# Patient Record
Sex: FEMALE | Race: WHITE | NOT HISPANIC OR LATINO | Employment: OTHER | URBAN - NONMETROPOLITAN AREA
[De-identification: names, ages, dates, MRNs, and addresses within clinical notes are randomized per-mention and may not be internally consistent; named-entity substitution may affect disease eponyms.]

---

## 2018-06-28 ENCOUNTER — HISTORICAL (OUTPATIENT)
Dept: ADMINISTRATIVE | Facility: HOSPITAL | Age: 63
End: 2018-06-28

## 2019-07-22 ENCOUNTER — HISTORICAL (OUTPATIENT)
Dept: ADMINISTRATIVE | Facility: HOSPITAL | Age: 64
End: 2019-07-22

## 2020-10-13 ENCOUNTER — HISTORICAL (OUTPATIENT)
Dept: ADMINISTRATIVE | Facility: HOSPITAL | Age: 65
End: 2020-10-13

## 2021-05-03 LAB
ALBUMIN SERPL-MCNC: 4.5 G/DL (ref 3.4–5)
ALBUMIN/GLOB SERPL: 1.6 {RATIO}
ALP SERPL-CCNC: 97 U/L (ref 50–144)
ALT SERPL-CCNC: 24 U/L (ref 1–45)
ANION GAP SERPL CALC-SCNC: 11 MMOL/L (ref 7–16)
AST SERPL-CCNC: 37 U/L (ref 14–36)
BASOPHILS # BLD AUTO: 0.02 X10(3)/MCL (ref 0.01–0.08)
BASOPHILS NFR BLD AUTO: 0.1 % (ref 0.1–1.2)
BILIRUB SERPL-MCNC: 0.53 MG/DL (ref 0.1–1)
BUN SERPL-MCNC: 23 MG/DL (ref 7–20)
CALCIUM SERPL-MCNC: 8.9 MG/DL (ref 8.4–10.2)
CHLORIDE SERPL-SCNC: 97 MMOL/L (ref 94–110)
CHOLEST SERPL-MCNC: 210 MG/DL (ref 0–200)
CO2 SERPL-SCNC: 31 MMOL/L (ref 21–32)
CREAT SERPL-MCNC: 0.8 MG/DL (ref 0.52–1.04)
CREAT/UREA NIT SERPL: 28.8 (ref 12–20)
DEPRECATED CALCIDIOL+CALCIFEROL SERPL-MC: 74.7 NG/ML (ref 30–100)
EOSINOPHIL # BLD AUTO: 0.39 X10(3)/MCL (ref 0.04–0.36)
EOSINOPHIL NFR BLD AUTO: 2.6 % (ref 0.7–7)
ERYTHROCYTE [DISTWIDTH] IN BLOOD BY AUTOMATED COUNT: 12.9 % (ref 11–14.5)
EST. AVERAGE GLUCOSE BLD GHB EST-MCNC: 114 MG/DL (ref 70–115)
GLOBULIN SER-MCNC: 2.9 G/DL (ref 2–3.9)
GLUCOSE SERPL-MCNC: 81 MGM./DL (ref 70–115)
HBA1C MFR BLD: 5.8 % (ref 4–6)
HCT VFR BLD AUTO: 43.7 % (ref 36–48)
HDLC SERPL-MCNC: 73 MG/DL (ref 40–60)
HGB BLD-MCNC: 14.4 G/DL (ref 11.8–16)
IMM GRANULOCYTES # BLD AUTO: 0.05 X10E3/UL (ref 0–0.03)
IMM GRANULOCYTES NFR BLD AUTO: 0.3 % (ref 0–0.5)
LDLC SERPL CALC-MCNC: 101.6 MG/DL (ref 30–100)
LYMPHOCYTES # BLD AUTO: 4 X10(3)/MCL (ref 1.16–3.74)
LYMPHOCYTES NFR BLD AUTO: 27.1 % (ref 20–55)
MCH RBC QN AUTO: 31 PG (ref 27–34)
MCHC RBC AUTO-ENTMCNC: 33 G/DL (ref 31–37)
MCV RBC AUTO: 94 FL (ref 79–99)
MONOCYTES # BLD AUTO: 1.17 X10(3)/MCL (ref 0.24–0.36)
MONOCYTES NFR BLD AUTO: 7.9 % (ref 4.7–12.5)
NEUTROPHILS # BLD AUTO: 9.11 X10(3)/MCL (ref 1.56–6.13)
NEUTROPHILS NFR BLD AUTO: 62 % (ref 37–73)
PLATELET # BLD AUTO: 280 X10(3)/MCL (ref 140–371)
PMV BLD AUTO: 11.6 FL (ref 9.4–12.4)
POTASSIUM SERPL-SCNC: 3.6 MMOL/L (ref 3.5–5.1)
PROT SERPL-MCNC: 7.4 G/DL (ref 6.3–8.2)
RBC # BLD AUTO: 4.65 X10(6)/MCL (ref 4–5.1)
SODIUM SERPL-SCNC: 139 MMOL/L (ref 135–145)
TRIGL SERPL-MCNC: 176 MG/DL (ref 30–200)
TSH SERPL-ACNC: 0.48 UIU/ML (ref 0.36–3.74)
WBC # SPEC AUTO: 14.7 X10(3)/MCL (ref 4–11.5)

## 2021-11-03 LAB
ALBUMIN SERPL-MCNC: 4.3 G/DL (ref 3.4–5)
ALBUMIN/GLOB SERPL: 1.6 {RATIO}
ALP SERPL-CCNC: 102 U/L (ref 50–144)
ALT SERPL-CCNC: 20 U/L (ref 1–45)
ANION GAP SERPL CALC-SCNC: 11 MMOL/L (ref 7–16)
AST SERPL-CCNC: 31 U/L (ref 14–36)
BASOPHILS # BLD AUTO: 0.05 10*3/UL (ref 0.01–0.08)
BASOPHILS NFR BLD AUTO: 0.4 % (ref 0.1–1.2)
BCS RECOMMENDATION EXT: NORMAL
BILIRUB SERPL-MCNC: 0.58 MG/DL (ref 0–1)
BUN SERPL-MCNC: 20 MG/DL (ref 7–20)
CALCIUM SERPL-MCNC: 9.1 MG/DL (ref 8.4–10.2)
CHLORIDE SERPL-SCNC: 100 MMOL/L (ref 94–110)
CHOLEST SERPL-MCNC: 177 MG/DL (ref 0–200)
CO2 SERPL-SCNC: 30 MMOL/L (ref 21–32)
CREAT SERPL-MCNC: 1.14 MG/DL (ref 0.52–1.04)
CREAT/UREA NIT SERPL: 17.5 (ref 12–20)
EOSINOPHIL # BLD AUTO: 0.44 10*3/UL (ref 0.04–0.36)
EOSINOPHIL NFR BLD AUTO: 3.8 % (ref 0.7–7)
ERYTHROCYTE [DISTWIDTH] IN BLOOD BY AUTOMATED COUNT: 12.1 % (ref 11–14.5)
EST. AVERAGE GLUCOSE BLD GHB EST-MCNC: 114 MG/DL (ref 70–115)
GLOBULIN SER-MCNC: 2.7 G/DL (ref 2–3.9)
GLUCOSE SERPL-MCNC: 112 MGM./DL (ref 70–115)
HBA1C MFR BLD: 5.8 % (ref 4–6)
HCT VFR BLD AUTO: 42.2 % (ref 36–48)
HDLC SERPL-MCNC: 66 MG/DL (ref 40–60)
HGB BLD-MCNC: 14.2 G/DL (ref 11.8–16)
IMM GRANULOCYTES # BLD AUTO: 0.05 10*3/UL (ref 0–0.03)
IMM GRANULOCYTES NFR BLD AUTO: 0.4 % (ref 0–0.5)
LDLC SERPL CALC-MCNC: 70.1 MG/DL (ref 30–100)
LYMPHOCYTES # BLD AUTO: 3.34 10*3/UL (ref 1.16–3.74)
LYMPHOCYTES NFR BLD AUTO: 28.8 % (ref 20–55)
MCH RBC QN AUTO: 30.8 PG (ref 27–34)
MCHC RBC AUTO-ENTMCNC: 33.6 G/DL (ref 31–37)
MCV RBC AUTO: 91.5 FL (ref 79–99)
MONOCYTES # BLD AUTO: 0.91 10*3/UL (ref 0.24–0.36)
MONOCYTES NFR BLD AUTO: 7.8 % (ref 4.7–12.5)
NEUTROPHILS # BLD AUTO: 6.81 10*3/UL (ref 1.56–6.13)
NEUTROPHILS NFR BLD AUTO: 58.8 % (ref 37–73)
PLATELET # BLD AUTO: 269 10*3/UL (ref 140–371)
PMV BLD AUTO: 11.5 FL (ref 9.4–12.4)
POTASSIUM SERPL-SCNC: 4.1 MMOL/L (ref 3.5–5.1)
PROT SERPL-MCNC: 7 G/DL (ref 6.3–8.2)
RBC # BLD AUTO: 4.61 10*6/UL (ref 4–5.1)
SODIUM SERPL-SCNC: 141 MMOL/L (ref 135–145)
TRIGL SERPL-MCNC: 286 MG/DL (ref 30–200)
WBC # SPEC AUTO: 11.6 10*3/UL (ref 4–11.5)

## 2021-12-22 LAB — NONINV COLON CA DNA+OCC BLD SCRN STL QL: NEGATIVE

## 2022-04-10 ENCOUNTER — HISTORICAL (OUTPATIENT)
Dept: ADMINISTRATIVE | Facility: HOSPITAL | Age: 67
End: 2022-04-10

## 2022-04-25 VITALS
SYSTOLIC BLOOD PRESSURE: 110 MMHG | HEIGHT: 64 IN | OXYGEN SATURATION: 97 % | WEIGHT: 225.31 LBS | BODY MASS INDEX: 38.47 KG/M2 | DIASTOLIC BLOOD PRESSURE: 64 MMHG

## 2022-05-03 ENCOUNTER — HISTORICAL (OUTPATIENT)
Dept: ADMINISTRATIVE | Facility: HOSPITAL | Age: 67
End: 2022-05-03

## 2022-05-03 LAB
AGE: 67
ALBUMIN SERPL-MCNC: 4.9 G/DL (ref 3.4–5)
ALBUMIN/GLOB SERPL: 1.8 {RATIO}
ALP SERPL-CCNC: 123 U/L (ref 50–144)
ALT SERPL-CCNC: 23 U/L (ref 1–45)
ANION GAP SERPL CALC-SCNC: 11 MMOL/L (ref 2–13)
AST SERPL-CCNC: 40 U/L (ref 14–36)
BASOPHILS # BLD AUTO: 0.05 10*3/UL (ref 0.01–0.08)
BASOPHILS NFR BLD AUTO: 0.5 % (ref 0.1–1.2)
BILIRUB SERPL-MCNC: 1 MG/DL (ref 0–1)
BUN SERPL-MCNC: 19 MG/DL (ref 7–20)
CALCIUM SERPL-MCNC: 9.4 MG/DL (ref 8.4–10.2)
CHLORIDE SERPL-SCNC: 100 MMOL/L (ref 98–110)
CHOLEST SERPL-MCNC: 196 MG/DL (ref 0–200)
CO2 SERPL-SCNC: 28 MMOL/L (ref 21–32)
CREAT SERPL-MCNC: 1.15 MG/DL (ref 0.52–1.04)
CREAT/UREA NIT SERPL: 16.5 (ref 12–20)
EOSINOPHIL # BLD AUTO: 0.25 10*3/UL (ref 0.04–0.36)
EOSINOPHIL NFR BLD AUTO: 2.3 % (ref 0.7–7)
ERYTHROCYTE [DISTWIDTH] IN BLOOD BY AUTOMATED COUNT: 12.1 % (ref 11–14.5)
EST. AVERAGE GLUCOSE BLD GHB EST-MCNC: 105 MG/DL (ref 70–115)
GLOBULIN SER-MCNC: 2.8 G/DL (ref 2–3.9)
GLUCOSE SERPL-MCNC: 109 MG/DL (ref 70–115)
HBA1C MFR BLD: 5.5 % (ref 4–6)
HCT VFR BLD AUTO: 42.6 % (ref 36–48)
HDLC SERPL-MCNC: 74 MG/DL (ref 40–60)
HGB BLD-MCNC: 15 G/DL (ref 11.8–16)
IMM GRANULOCYTES # BLD AUTO: 0.02 10*3/UL (ref 0–0.03)
IMM GRANULOCYTES NFR BLD AUTO: 0.2 % (ref 0–0.5)
LDLC SERPL CALC-MCNC: 79.7 MG/DL (ref 30–100)
LYMPHOCYTES # BLD AUTO: 2.86 10*3/UL (ref 1.16–3.74)
LYMPHOCYTES NFR BLD AUTO: 26.1 % (ref 20–55)
MANUAL DIFF? (OHS): NORMAL
MCH RBC QN AUTO: 31.4 PG (ref 27–34)
MCHC RBC AUTO-ENTMCNC: 35.2 G/DL (ref 31–37)
MCV RBC AUTO: 89.3 FL (ref 79–99)
MONOCYTES # BLD AUTO: 0.78 10*3/UL (ref 0.24–0.36)
MONOCYTES NFR BLD AUTO: 7.1 % (ref 4.7–12.5)
NEUTROPHILS # BLD AUTO: 7.01 10*3/UL (ref 1.56–6.13)
NEUTROPHILS NFR BLD AUTO: 63.8 % (ref 37–73)
PLATELET # BLD AUTO: 290 10*3/UL (ref 140–371)
PMV BLD AUTO: 11.4 FL (ref 9.4–12.4)
POTASSIUM SERPL-SCNC: 3.9 MMOL/L (ref 3.5–5.1)
PROT SERPL-MCNC: 7.7 G/DL (ref 6.3–8.2)
RBC # BLD AUTO: 4.77 10*6/UL (ref 4–5.1)
SODIUM SERPL-SCNC: 139 MMOL/L (ref 135–145)
TRIGL SERPL-MCNC: 112 MG/DL (ref 30–200)
TSH SERPL-ACNC: 0.74 M[IU]/L (ref 0.36–3.74)
WBC # SPEC AUTO: 11 10*3/UL (ref 4–11.5)

## 2022-05-03 NOTE — HISTORICAL [facility]ER
This is a historical note converted from Luc. Formatting and pictures may have been removed.  Please reference Luc for original formatting and attached multimedia. Chief Complaint  check up  History of Present Illness  65-year-old female?here for follow-up on chronic medical conditions. ?See assessment plan for each individual listed problems  Review of Systems  Feels like she had a little flareup of her lupus after her first and second Covid vaccines otherwise doing well  Physical Exam  Vitals & Measurements  T:?36.9? ?C (Oral)? HR:?68(Peripheral)? BP:?110/70? SpO2:?97%?  HT:?162.50?cm? WT:?103.200?kg? BMI:?39.08?  Heart with regular rate and rhythm  Lungs clear to auscultation bilateral  Medicare Visit  Health Risk Assessment  Feeling down, depressed, anx or nerv: Not at all  Pain present in last 4 wks: Moderate pain  Has it been hard for you to get help: Yes, as much as I wanted  Hardest physical activity for 2 min.: Moderate  Have trouble getting around outside: Yes  Do you need assistance while shopping: Yes  Can you prepare your own meals: Yes  Can you do your housework without help: Yes  Do you need help at home with needs: No  Can you handle your own money: Yes  How have things been going for you: Pretty well  Are you having difficulty driving: No  Seatbelt always on when driving: Yes, sometimes  Falling or dizzy when standing up: Never  Sexual problems: Never  Trouble eating well: Never  Teeth or denture problems: Never  Problems using the telephone: Never  Tired or fatigue: Often  Have you fallen two or more times: No  Are you afraid of falling: No  Are you physically active 3 ?days a week: Yes, some of the time  Hazards in your house that mt hurt you: Yes  Keeping track of your medications: Yes  Do you have trouble taking med as rx: I always take them as prescribed  Can you manage your health problems: Very Confident  Visual Acuity  Corrective Lenses: Glasses  Eye, Right Visual Acuity: 20/30  Eye,  Right w/Correction Visual Acuity: 20/30  Eye, Left Visual Acuity: 20/30  Eye, Left w/Correction Visual Acuity: 20/25  Eye, Bilateral Visual Acuity: 20/30  Eye, Bilat w/Correction Visual Acuity: 20/20  Cognitive Assessment  Response to Current Year: Correct  Response to Current Month: Correct  Response to Current Time: Correct  Count Backward 20 to 1: Correct  State Months in Reverse Order: Correct  Repeat Memory Phrase: Correct  [facility] Test Score Indication: None or no significant cognitive impairment  Orientation Memory Concentration Score: 0  Functional Assessment  Living Situation: Home independently  Home Equipment: Blood pressure monitor, Nebulizer  Fall Risk  History of Fall in Last 3 Months Feliciano: No  Presence of Secondary Diagnosis Feliciano: Yes  Use of Ambulatory Aid Feliciano: None, bedrest, wheelchair, nurse  IV/Heparin Lock Fall Risk Feliciano: No  Gait Weak or Impaired Fall Risk Feliciano: Normal, bedrest, immobile  Mental Status Fall Risk Feliciano: Oriented to own ability  Feliciano Fall Risk Score: 15  Home Safety  Emergency Numbers Kept/Updated: Yes  Aware of Smoking Dangers: Yes  Smoke Alarms/Fire Extinguisher Available: Yes  Household Members Fire Safety Knowledge: Yes  Firearms Unloaded and Secure: Yes  Floor Rugs Removed or Fastened: Yes  Mats in Bathtub/Shower: Yes  Stairway Rails or Banisters: Yes  Outdoor Clutter Safety: Yes  Indoor Clutter Safety: Yes  Electrical Cord Safety: Yes  Depression Screening  Initial Depression Screen Score: 0  TUG Test  Time in Seconds: 5  Observations: Slow tentative pace  Advance Directive  Advanced Directives: No  Advance Directive Additional Information: Yes  Assessment/Plan  1.?Benign essential hypertension ? I10  ?Blood pressure currently well controlled?on enalapril  2.?Diabetes mellitus type 2 ? E11.9  ?Has been doing well on Metformin.? We will be checking an A1c today  3.?HLD - Hyperlipidemia ? E78.5  ?On atorvastatin  4.?Hypothyroidism ? E03.9  ?Continue levothyroxine?125 mcg  daily. ?We will be checking a TSH today  5.?Lyme disease, unspecified ? A69.20  ?Continue daily azithromycin  6.?Peripheral neuropathy ? G64  7.?SLE - Systemic lupus erythematosus ? M32.9  ?On Celebrex?and occasional Medrol packs for flareups  8.?Medicare annual wellness visit, subsequent ? Z00.00  ?Up-to-date on mammography and colonoscopy.? She needs to do her diabetic eye exam  Orders:  C-Reactive Protein Quant - [facility] [id number], Routine collect, 05/03/21 13:59:00 CDT, Blood, Stop date 05/03/21 13:59:00 CDT, Lab Collect, [id number], Benign essential hypertension  Diabetes mellitus type 2  HLD - Hyperlipidemia  Hypothyroidism  Lyme disease, unspecified  Peripheral neuropathy...  Clinic Follow up, *Est. 11/03/21 13:30:00 CDT, Order for future visit, Benign essential hypertension, [facility]  Medicare Annual Wellness- Subsequent  [facility], Benign essential hypertension  Diabetes mellitus type 2  HLD - Hyperlipidemia  Hypothyroidism  Lyme disease, unspecified  Peripheral neuropathy  SLE - Systemic lupus erythematosus  Medicare annual wellness visit, subsequent, [facility]  Office/Outpatient Visit Level 4 Established [id number] PC, Benign essential hypertension  Diabetes mellitus type 2  HLD - Hyperlipidemia  Hypothyroidism  Lyme disease, unspecified  Peripheral neuropathy  SLE - Systemic lupus erythematosus  Medicare annual wellness visit, subsequent, [facility]  Request Eye Exam Results, 05/03/21 14:03:00 CDT, Benign essential hypertension  Diabetes mellitus type 2  HLD - Hyperlipidemia  Hypothyroidism  Lyme disease, unspecified  Peripheral neuropathy  SLE - Systemic lupus erythematosus  Medicare annual wellness visit, subsequent  Follow-up in 6 months  Referrals  Clinic Follow up, *Est. 11/03/21 13:30:00 CDT, Order for future visit, Benign essential hypertension, [facility]   Problem List/Past Medical  History  Ongoing  Benign essential hypertension  Diabetes mellitus type 2  HLD - Hyperlipidemia  Hypothyroidism  Lyme disease, unspecified  Medicare annual wellness visit, subsequent  Obesity  Peripheral neuropathy  Seborrheic keratosis  SLE - Systemic lupus erythematosus  Historical  No qualifying data  Procedure/Surgical History  Appendectomy ()  Hysterectomy ()  Bilateral tubal ligation ()   section ()  T and A (tonsillectomy and adenoidectomy) postoperative education ()   Medications  acyclovir 800 mg oral tablet, 800 mg= 1 tab(s), Oral, BID  Advair Diskus 250 mcg-50 mcg inhalation powder, 1 puff(s), INH, BID, 5 refills  atenolol 100 mg oral tablet, 100 mg= 1 tab(s), Oral, Daily, 3 refills  atorvastatin 20 mg oral tablet, 20 mg= 1 tab(s), Oral, Daily, 1 refills  azithromycin 250 mg oral tablet, 250 mg= 1 tab(s), Oral, Daily, 1 refills  bumetanide 1 mg oral tablet, 1 mg= 1 tab(s), Oral, Daily, 5 refills  celecoxib 200 mg oral capsule, See Instructions, 1 refills  dicyclomine 10 mg oral capsule, 20 mg= 2 cap(s), Oral, QID, 1 refills  levothyroxine 125 mcg (0.125 mg) oral tablet, 125 mcg= 1 tab(s), Oral, Daily, 1 refills  metformin 500 mg oral tablet, 500 mg= 1 tab(s), Oral, Daily, 1 refills  Prilosec 20 mg oral DR capsule, 20 mg= 1 cap(s), Oral, Daily  traMADol 50 mg oral tablet, 100 mg= 2 tab(s), Oral, q6hr, PRN, 3 refills  Vasotec 5 mg oral tablet, 5 mg= 1 tab(s), Oral, Daily, 1 refills  Ventolin HFA 90 mcg/inh inhalation aerosol, 2 puff(s), INH, q6hr, PRN, 5 refills  Vitamin D3 5000 intl units (125 mcg) oral capsule, 5000 IntUnit= 1 cap(s), Oral, Daily  Xanax 1 mg oral tablet, 1 mg= 1 tab(s), Oral, Daily, 3 refills  Allergies  Morphine Sulfate?(Unspecified)  Zofran?(Unspecified)  acetaminophen-hydrocodone?(Unspecified)  penicillins?(Unspecified)  Social History  Abuse/Neglect  No, 2021  Tobacco  Former smoker, quit more than 30 days ago, No, 2021  Family History  Cardiac  arrhythmia.: Mother.  Dementia: Father.  Hypertension.: Mother and Father.  Primary malignant neoplasm of prostate: Father.  Immunizations  Vaccine Date Status   COVID-19 MRNA, LNP-S, PF- Pfizer 03/04/2021 Recorded   COVID-19 MRNA, LNP-S, PF- Pfizer 02/11/2021 Recorded   influenza virus vaccine, inactivated 09/11/2020 Recorded   influenza virus vaccine, inactivated 09/21/2019 Recorded   influenza virus vaccine, inactivated 09/07/2018 Recorded   influenza virus vaccine, inactivated 09/09/2017 Recorded   influenza virus vaccine, inactivated 09/26/2014 Recorded   zoster vaccine live 07/25/2014 Recorded   Health Maintenance  Health Maintenance  ???Pending?(in the next year)  ??? ??OverDue  ??? ? ? ?Influenza Vaccine due??10/01/20??and every 1??day(s)  ??? ? ? ?Alcohol Misuse Screening due??01/02/21??and every 1??year(s)  ??? ??Due?  ??? ? ? ?ADL Screening due??05/03/21??and every 1??year(s)  ??? ? ? ?Aspirin Therapy for CVD Prevention due??05/03/21??and every 1??year(s)  ??? ? ? ?Bone Density Screening due??05/03/21??Variable frequency  ??? ? ? ?Colorectal Screening due??05/03/21??Unknown Frequency  ??? ? ? ?Diabetes Maintenance-Eye Exam due??05/03/21??Unknown Frequency  ??? ? ? ?Diabetes Maintenance-Fasting Lipid Profile due??05/03/21??Variable frequency  ??? ? ? ?Diabetes Maintenance-Foot Exam due??05/03/21??Unknown Frequency  ??? ? ? ?Diabetes Maintenance-Serum Creatinine due??05/03/21??Variable frequency  ??? ? ? ?Hypertension Management-Education due??05/03/21??and every 1??year(s)  ??? ? ? ?Pneumococcal Vaccine due??05/03/21??Unknown Frequency  ??? ? ? ?Tetanus Vaccine due??05/03/21??and every 10??year(s)  ??? ? ? ?Zoster Vaccine due??05/03/21??Unknown Frequency  ??? ??Due In Future?  ??? ? ? ?Diabetes Maintenance-HgbA1c not due until??08/24/21??and every 1??year(s)  ??? ? ? ?Hypertension Management-BMP not due until??08/24/21??and every 1??year(s)  ??? ? ? ?Obesity Screening not due until??01/01/22??and every  1??year(s)  ??? ? ? ?Advance Directive not due until??01/02/22??and every 1??year(s)  ??? ? ? ?Cognitive Screening not due until??01/02/22??and every 1??year(s)  ??? ? ? ?Fall Risk Assessment not due until??01/02/22??and every 1??year(s)  ??? ? ? ?Functional Assessment not due until??01/02/22??and every 1??year(s)  ???Satisfied?(in the past 1 year)  ??? ??Satisfied?  ??? ? ? ?Advance Directive on??05/03/21.??Satisfied by Song SANCHEZ, Vikki L.  ??? ? ? ?Blood Pressure Screening on??05/03/21.??Satisfied by Song SANCHEZ, Vikki L.  ??? ? ? ?Body Mass Index Check on??05/03/21.??Satisfied by Song SANCHEZ, Vikki L.  ??? ? ? ?Cognitive Screening on??05/03/21.??Satisfied by Song SANCHEZ, Vikki L.  ??? ? ? ?Depression Screening on??05/03/21.??Satisfied by Song SANCHEZ, Vikki L.  ??? ? ? ?Diabetes Maintenance-Eye Exam on??05/03/21.??Satisfied by Cuong White MD  ??? ? ? ?Fall Risk Assessment on??05/03/21.??Satisfied by Song SANCHEZ, Vikki L.  ??? ? ? ?Functional Assessment on??05/03/21.??Satisfied by Song SANCHEZ, Vikki L.  ??? ? ? ?Hypertension Management-Blood Pressure on??05/03/21.??Satisfied by Song SANCHEZ, Vikki L.  ??? ? ? ?Influenza Vaccine on??03/03/21.??Satisfied by Song SANCHEZ, Vikki L.  ??? ? ? ?Medicare Annual Wellness Exam on??05/03/21.??Satisfied by Cuong White MD  ??? ? ? ?Obesity Screening on??05/03/21.??Satisfied by Song SANCHEZ, Vikki L.  ?      ambulatory

## 2022-08-29 ENCOUNTER — DOCUMENTATION ONLY (OUTPATIENT)
Dept: ADMINISTRATIVE | Facility: HOSPITAL | Age: 67
End: 2022-08-29

## 2022-11-03 ENCOUNTER — HISTORICAL (OUTPATIENT)
Dept: ADMINISTRATIVE | Facility: HOSPITAL | Age: 67
End: 2022-11-03

## 2023-01-25 DIAGNOSIS — M32.9 SYSTEMIC LUPUS ERYTHEMATOSUS, UNSPECIFIED SLE TYPE, UNSPECIFIED ORGAN INVOLVEMENT STATUS: ICD-10-CM

## 2023-01-25 DIAGNOSIS — B34.9 RECURRENT VIRAL INFECTION: ICD-10-CM

## 2023-01-25 DIAGNOSIS — F41.9 ANXIETY: Primary | ICD-10-CM

## 2023-01-25 RX ORDER — TRAMADOL HYDROCHLORIDE 50 MG/1
100 TABLET ORAL EVERY 6 HOURS PRN
Qty: 60 TABLET | Refills: 2 | Status: SHIPPED | OUTPATIENT
Start: 2023-01-25 | End: 2023-06-06 | Stop reason: SDUPTHER

## 2023-01-25 RX ORDER — ALPRAZOLAM 1 MG/1
1 TABLET ORAL EVERY MORNING
COMMUNITY
Start: 2022-11-30 | End: 2023-01-25 | Stop reason: SDUPTHER

## 2023-01-25 RX ORDER — TRAMADOL HYDROCHLORIDE 50 MG/1
100 TABLET ORAL EVERY 6 HOURS PRN
COMMUNITY
Start: 2022-10-24 | End: 2023-01-25 | Stop reason: SDUPTHER

## 2023-01-25 RX ORDER — ACYCLOVIR 800 MG/1
800 TABLET ORAL 2 TIMES DAILY
Qty: 10 TABLET | Refills: 0 | Status: SHIPPED | OUTPATIENT
Start: 2023-01-25 | End: 2023-06-06 | Stop reason: SDUPTHER

## 2023-01-25 RX ORDER — ACYCLOVIR 800 MG/1
1 TABLET ORAL 2 TIMES DAILY
COMMUNITY
Start: 2021-12-22 | End: 2023-01-25 | Stop reason: SDUPTHER

## 2023-01-25 RX ORDER — ALPRAZOLAM 1 MG/1
1 TABLET ORAL EVERY MORNING
Qty: 30 TABLET | Refills: 2 | Status: SHIPPED | OUTPATIENT
Start: 2023-01-25 | End: 2023-06-06 | Stop reason: SDUPTHER

## 2023-01-25 NOTE — TELEPHONE ENCOUNTER
----- Message from Bianca Campbell MA sent at 1/25/2023  9:39 AM CST -----  Regarding: Refill  Contact: Keisha Pelayo  .Type:  RX Refill Request    Who Called: Keisha Pelayo    RX Name and Strength:    acyclovir 800mg, BID x 5days,   tramadol 50mg, q6h,   alprazolam 1mg, QD    Preferred Pharmacy: [facility]      Best Call Back Number:[phone]

## 2023-01-30 ENCOUNTER — DOCUMENTATION ONLY (OUTPATIENT)
Dept: ADMINISTRATIVE | Facility: HOSPITAL | Age: 68
End: 2023-01-30
Payer: MEDICARE

## 2023-04-12 ENCOUNTER — TELEPHONE (OUTPATIENT)
Dept: FAMILY MEDICINE | Facility: CLINIC | Age: 68
End: 2023-04-12
Payer: MEDICARE

## 2023-04-12 DIAGNOSIS — I10 HYPERTENSION, UNSPECIFIED TYPE: Primary | ICD-10-CM

## 2023-04-12 RX ORDER — ENALAPRIL MALEATE 5 MG/1
5 TABLET ORAL DAILY
Qty: 90 TABLET | Refills: 1 | Status: SHIPPED | OUTPATIENT
Start: 2023-04-12 | End: 2023-10-10 | Stop reason: SDUPTHER

## 2023-04-12 RX ORDER — METFORMIN HYDROCHLORIDE 500 MG/1
500 TABLET ORAL
COMMUNITY
Start: 2023-03-06 | End: 2023-06-06 | Stop reason: SDUPTHER

## 2023-04-12 RX ORDER — ATORVASTATIN CALCIUM 20 MG/1
20 TABLET, FILM COATED ORAL
COMMUNITY
Start: 2023-04-12 | End: 2023-06-06 | Stop reason: SDUPTHER

## 2023-05-03 ENCOUNTER — OFFICE VISIT (OUTPATIENT)
Dept: FAMILY MEDICINE | Facility: CLINIC | Age: 68
End: 2023-05-03
Payer: MEDICARE

## 2023-05-03 VITALS
OXYGEN SATURATION: 96 % | DIASTOLIC BLOOD PRESSURE: 58 MMHG | HEIGHT: 64 IN | WEIGHT: 213.38 LBS | SYSTOLIC BLOOD PRESSURE: 98 MMHG | TEMPERATURE: 97 F | BODY MASS INDEX: 36.43 KG/M2 | HEART RATE: 67 BPM

## 2023-05-03 DIAGNOSIS — E11.9 TYPE 2 DIABETES MELLITUS WITHOUT COMPLICATION, WITHOUT LONG-TERM CURRENT USE OF INSULIN: ICD-10-CM

## 2023-05-03 DIAGNOSIS — M32.9 SYSTEMIC LUPUS ERYTHEMATOSUS, UNSPECIFIED SLE TYPE, UNSPECIFIED ORGAN INVOLVEMENT STATUS: ICD-10-CM

## 2023-05-03 DIAGNOSIS — I10 PRIMARY HYPERTENSION: ICD-10-CM

## 2023-05-03 DIAGNOSIS — N18.31 CHRONIC KIDNEY DISEASE, STAGE 3A: Primary | ICD-10-CM

## 2023-05-03 DIAGNOSIS — E78.2 MIXED HYPERLIPIDEMIA: ICD-10-CM

## 2023-05-03 DIAGNOSIS — E03.9 ACQUIRED HYPOTHYROIDISM: ICD-10-CM

## 2023-05-03 PROCEDURE — 99214 OFFICE O/P EST MOD 30 MIN: CPT | Mod: ,,, | Performed by: FAMILY MEDICINE

## 2023-05-03 PROCEDURE — 99214 PR OFFICE/OUTPT VISIT, EST, LEVL IV, 30-39 MIN: ICD-10-PCS | Mod: ,,, | Performed by: FAMILY MEDICINE

## 2023-05-03 RX ORDER — ATENOLOL 100 MG/1
25 TABLET ORAL DAILY
COMMUNITY
Start: 2022-12-22

## 2023-05-03 RX ORDER — CELECOXIB 200 MG/1
200 CAPSULE ORAL 2 TIMES DAILY
COMMUNITY
Start: 2022-12-23

## 2023-05-03 RX ORDER — DICYCLOMINE HYDROCHLORIDE 10 MG/1
20 CAPSULE ORAL
COMMUNITY
Start: 2022-12-29

## 2023-05-03 RX ORDER — OMEPRAZOLE 20 MG/1
20 CAPSULE, DELAYED RELEASE ORAL DAILY
COMMUNITY

## 2023-05-03 RX ORDER — LEVOTHYROXINE SODIUM 125 UG/1
125 TABLET ORAL DAILY
COMMUNITY
Start: 2023-04-12 | End: 2023-07-21

## 2023-05-03 RX ORDER — BUMETANIDE 1 MG/1
1 TABLET ORAL DAILY
COMMUNITY
Start: 2023-04-12 | End: 2023-10-10 | Stop reason: SDUPTHER

## 2023-05-03 NOTE — PROGRESS NOTES
"SUBJECTIVE:  Keisha Pelayo is a 67 y.o. female here for Follow-up      HPI  Patient is here for follow-up.  See assessment plan for individual list of issues.  Keisha's allergies, medications, history, and problem list were updated as appropriate.    Review of Systems   She is doing pretty well at this time.  She is not been having recent flares of her lupus    No results found for this or any previous visit (from the past 504 hour(s)).    OBJECTIVE:  Vital signs  Vitals:    05/03/23 1315   BP: (!) 98/58   BP Location: Right arm   Pulse: 67   Temp: 96.8 °F (36 °C)   TempSrc: Temporal   SpO2: 96%   Weight: 96.8 kg (213 lb 6.4 oz)   Height: 5' 3.98" (1.625 m)        Physical Exam heart with a regular rate and rhythm, lungs are clear    ASSESSMENT/PLAN:  1. Chronic kidney disease, stage 3a      2. Mixed hyperlipidemia  Continue atorvastatin    3. Systemic lupus erythematosus, unspecified SLE type, unspecified organ involvement status  Doing well at this time with just Celebrex.    4. Type 2 diabetes mellitus without complication, without long-term current use of insulin  On metformin    5. Acquired hypothyroidism  On levothyroxine 125 micrograms daily    6. Primary hypertension  Still on low-dose atenolol and enalapril which she will continue because of kidney disease though her blood pressure is very well controlled at this time         Follow Up:  Follow up in about 6 months (around 11/3/2023).  And Medicare wellness.  We will do labs at that time          "

## 2023-06-06 DIAGNOSIS — B34.9 RECURRENT VIRAL INFECTION: ICD-10-CM

## 2023-06-06 DIAGNOSIS — F41.9 ANXIETY: ICD-10-CM

## 2023-06-06 DIAGNOSIS — E11.9 TYPE 2 DIABETES MELLITUS WITHOUT COMPLICATION, WITHOUT LONG-TERM CURRENT USE OF INSULIN: ICD-10-CM

## 2023-06-06 DIAGNOSIS — E78.2 MIXED HYPERLIPIDEMIA: Primary | ICD-10-CM

## 2023-06-06 DIAGNOSIS — M32.9 SYSTEMIC LUPUS ERYTHEMATOSUS, UNSPECIFIED SLE TYPE, UNSPECIFIED ORGAN INVOLVEMENT STATUS: ICD-10-CM

## 2023-06-06 RX ORDER — ATORVASTATIN CALCIUM 20 MG/1
20 TABLET, FILM COATED ORAL NIGHTLY
Qty: 90 TABLET | Refills: 1 | Status: SHIPPED | OUTPATIENT
Start: 2023-06-06 | End: 2023-12-20 | Stop reason: SDUPTHER

## 2023-06-06 RX ORDER — ALPRAZOLAM 1 MG/1
1 TABLET ORAL EVERY MORNING
Qty: 30 TABLET | Refills: 2 | Status: SHIPPED | OUTPATIENT
Start: 2023-06-06 | End: 2023-10-10 | Stop reason: SDUPTHER

## 2023-06-06 RX ORDER — METFORMIN HYDROCHLORIDE 500 MG/1
500 TABLET ORAL DAILY
Qty: 90 TABLET | Refills: 1 | Status: SHIPPED | OUTPATIENT
Start: 2023-06-06 | End: 2023-12-01

## 2023-06-06 RX ORDER — TRAMADOL HYDROCHLORIDE 50 MG/1
100 TABLET ORAL EVERY 6 HOURS PRN
Qty: 60 TABLET | Refills: 2 | Status: SHIPPED | OUTPATIENT
Start: 2023-06-06 | End: 2023-10-10 | Stop reason: SDUPTHER

## 2023-06-06 RX ORDER — ACYCLOVIR 800 MG/1
800 TABLET ORAL 2 TIMES DAILY
Qty: 10 TABLET | Refills: 0 | Status: SHIPPED | OUTPATIENT
Start: 2023-06-06 | End: 2023-07-21

## 2023-06-07 ENCOUNTER — TELEPHONE (OUTPATIENT)
Dept: FAMILY MEDICINE | Facility: CLINIC | Age: 68
End: 2023-06-07
Payer: MEDICARE

## 2023-06-07 DIAGNOSIS — M32.9 SYSTEMIC LUPUS ERYTHEMATOSUS, UNSPECIFIED SLE TYPE, UNSPECIFIED ORGAN INVOLVEMENT STATUS: Primary | ICD-10-CM

## 2023-06-07 RX ORDER — METHYLPREDNISOLONE 4 MG/1
TABLET ORAL
Qty: 21 EACH | Refills: 0 | Status: SHIPPED | OUTPATIENT
Start: 2023-06-07 | End: 2023-12-27 | Stop reason: SDUPTHER

## 2023-07-21 ENCOUNTER — TELEPHONE (OUTPATIENT)
Dept: FAMILY MEDICINE | Facility: CLINIC | Age: 68
End: 2023-07-21
Payer: MEDICARE

## 2023-07-21 DIAGNOSIS — B34.9 RECURRENT VIRAL INFECTION: ICD-10-CM

## 2023-07-21 DIAGNOSIS — E03.9 ACQUIRED HYPOTHYROIDISM: Primary | ICD-10-CM

## 2023-07-21 RX ORDER — LEVOTHYROXINE SODIUM 125 UG/1
TABLET ORAL
Qty: 90 TABLET | Refills: 0 | Status: SHIPPED | OUTPATIENT
Start: 2023-07-21 | End: 2023-10-10 | Stop reason: SDUPTHER

## 2023-07-21 RX ORDER — ACYCLOVIR 800 MG/1
TABLET ORAL
Qty: 10 TABLET | Refills: 0 | Status: SHIPPED | OUTPATIENT
Start: 2023-07-21 | End: 2023-10-11 | Stop reason: SDUPTHER

## 2023-10-10 DIAGNOSIS — M32.9 SYSTEMIC LUPUS ERYTHEMATOSUS, UNSPECIFIED SLE TYPE, UNSPECIFIED ORGAN INVOLVEMENT STATUS: ICD-10-CM

## 2023-10-10 DIAGNOSIS — I10 HYPERTENSION, UNSPECIFIED TYPE: ICD-10-CM

## 2023-10-10 DIAGNOSIS — F41.9 ANXIETY: ICD-10-CM

## 2023-10-10 DIAGNOSIS — E03.9 ACQUIRED HYPOTHYROIDISM: ICD-10-CM

## 2023-10-10 RX ORDER — TRAMADOL HYDROCHLORIDE 50 MG/1
100 TABLET ORAL EVERY 6 HOURS PRN
Qty: 60 TABLET | Refills: 2 | Status: SHIPPED | OUTPATIENT
Start: 2023-10-10 | End: 2024-03-13 | Stop reason: SDUPTHER

## 2023-10-10 RX ORDER — BUMETANIDE 1 MG/1
1 TABLET ORAL DAILY
Qty: 90 TABLET | Refills: 1 | Status: SHIPPED | OUTPATIENT
Start: 2023-10-10 | End: 2024-03-13 | Stop reason: SDUPTHER

## 2023-10-10 RX ORDER — ENALAPRIL MALEATE 5 MG/1
5 TABLET ORAL DAILY
Qty: 90 TABLET | Refills: 1 | Status: SHIPPED | OUTPATIENT
Start: 2023-10-10

## 2023-10-10 RX ORDER — ALPRAZOLAM 1 MG/1
1 TABLET ORAL EVERY MORNING
Qty: 30 TABLET | Refills: 2 | Status: SHIPPED | OUTPATIENT
Start: 2023-10-10 | End: 2024-03-13 | Stop reason: SDUPTHER

## 2023-10-10 RX ORDER — LEVOTHYROXINE SODIUM 125 UG/1
125 TABLET ORAL
Qty: 90 TABLET | Refills: 1 | Status: SHIPPED | OUTPATIENT
Start: 2023-10-10

## 2023-10-11 ENCOUNTER — TELEPHONE (OUTPATIENT)
Dept: FAMILY MEDICINE | Facility: CLINIC | Age: 68
End: 2023-10-11
Payer: MEDICARE

## 2023-10-11 DIAGNOSIS — B34.9 RECURRENT VIRAL INFECTION: ICD-10-CM

## 2023-10-11 DIAGNOSIS — Z12.31 ENCOUNTER FOR SCREENING MAMMOGRAM FOR BREAST CANCER: Primary | ICD-10-CM

## 2023-10-11 RX ORDER — ACYCLOVIR 800 MG/1
800 TABLET ORAL 2 TIMES DAILY
Qty: 10 TABLET | Refills: 0 | Status: SHIPPED | OUTPATIENT
Start: 2023-10-11 | End: 2023-11-02 | Stop reason: SDUPTHER

## 2023-11-02 ENCOUNTER — OFFICE VISIT (OUTPATIENT)
Dept: FAMILY MEDICINE | Facility: CLINIC | Age: 68
End: 2023-11-02
Payer: MEDICARE

## 2023-11-02 ENCOUNTER — HOSPITAL ENCOUNTER (OUTPATIENT)
Dept: RADIOLOGY | Facility: HOSPITAL | Age: 68
Discharge: HOME | End: 2023-11-02
Attending: FAMILY MEDICINE
Payer: MEDICARE

## 2023-11-02 VITALS
OXYGEN SATURATION: 99 % | HEART RATE: 75 BPM | DIASTOLIC BLOOD PRESSURE: 68 MMHG | HEIGHT: 64 IN | WEIGHT: 213.81 LBS | SYSTOLIC BLOOD PRESSURE: 138 MMHG | BODY MASS INDEX: 36.5 KG/M2 | TEMPERATURE: 97 F

## 2023-11-02 DIAGNOSIS — N18.31 CHRONIC KIDNEY DISEASE, STAGE 3A: ICD-10-CM

## 2023-11-02 DIAGNOSIS — B34.9 RECURRENT VIRAL INFECTION: ICD-10-CM

## 2023-11-02 DIAGNOSIS — M32.9 SYSTEMIC LUPUS ERYTHEMATOSUS, UNSPECIFIED SLE TYPE, UNSPECIFIED ORGAN INVOLVEMENT STATUS: ICD-10-CM

## 2023-11-02 DIAGNOSIS — E78.2 MIXED HYPERLIPIDEMIA: Primary | ICD-10-CM

## 2023-11-02 DIAGNOSIS — Z12.31 ENCOUNTER FOR SCREENING MAMMOGRAM FOR BREAST CANCER: ICD-10-CM

## 2023-11-02 DIAGNOSIS — Z00.00 MEDICARE ANNUAL WELLNESS VISIT, SUBSEQUENT: ICD-10-CM

## 2023-11-02 DIAGNOSIS — E03.9 ACQUIRED HYPOTHYROIDISM: ICD-10-CM

## 2023-11-02 DIAGNOSIS — J44.9 CHRONIC OBSTRUCTIVE PULMONARY DISEASE, UNSPECIFIED COPD TYPE: ICD-10-CM

## 2023-11-02 DIAGNOSIS — E66.01 SEVERE OBESITY (BMI 35.0-39.9) WITH COMORBIDITY: ICD-10-CM

## 2023-11-02 DIAGNOSIS — I10 PRIMARY HYPERTENSION: ICD-10-CM

## 2023-11-02 DIAGNOSIS — E11.9 TYPE 2 DIABETES MELLITUS WITHOUT COMPLICATION, WITHOUT LONG-TERM CURRENT USE OF INSULIN: ICD-10-CM

## 2023-11-02 LAB
ALBUMIN SERPL-MCNC: 4.5 G/DL (ref 3.4–5)
ALBUMIN/GLOB SERPL: 1.8 RATIO
ALP SERPL-CCNC: 94 UNIT/L (ref 50–144)
ALT SERPL-CCNC: 25 UNIT/L (ref 1–45)
ANION GAP SERPL CALC-SCNC: 13 MEQ/L (ref 2–13)
AST SERPL-CCNC: 38 UNIT/L (ref 14–36)
BASOPHILS # BLD AUTO: 0.04 X10(3)/MCL (ref 0.01–0.08)
BASOPHILS NFR BLD AUTO: 0.4 % (ref 0.1–1.2)
BILIRUB SERPL-MCNC: 0.5 MG/DL (ref 0–1)
BUN SERPL-MCNC: 18 MG/DL (ref 7–20)
CALCIUM SERPL-MCNC: 9.3 MG/DL (ref 8.4–10.2)
CHLORIDE SERPL-SCNC: 103 MMOL/L (ref 98–110)
CHOLEST SERPL-MCNC: 197 MG/DL (ref 0–200)
CO2 SERPL-SCNC: 26 MMOL/L (ref 21–32)
CREAT SERPL-MCNC: 0.81 MG/DL (ref 0.66–1.25)
CREAT/UREA NIT SERPL: 22 (ref 12–20)
EOSINOPHIL # BLD AUTO: 0.26 X10(3)/MCL (ref 0.04–0.36)
EOSINOPHIL NFR BLD AUTO: 2.7 % (ref 0.7–7)
ERYTHROCYTE [DISTWIDTH] IN BLOOD BY AUTOMATED COUNT: 11.9 % (ref 11–14.5)
GFR SERPLBLD CREATININE-BSD FMLA CKD-EPI: 79 MLS/MIN/1.73/M2
GLOBULIN SER-MCNC: 2.5 GM/DL (ref 2–3.9)
GLUCOSE SERPL-MCNC: 94 MG/DL (ref 70–115)
HCT VFR BLD AUTO: 38.9 % (ref 36–48)
HDLC SERPL-MCNC: 65 MG/DL (ref 40–60)
HGB BLD-MCNC: 13.7 G/DL (ref 11.8–16)
IMM GRANULOCYTES # BLD AUTO: 0.03 X10(3)/MCL (ref 0–0.03)
IMM GRANULOCYTES NFR BLD AUTO: 0.3 % (ref 0–0.5)
LDLC SERPL DIRECT ASSAY-SCNC: 90.9 MG/DL (ref 30–100)
LYMPHOCYTES # BLD AUTO: 2.52 X10(3)/MCL (ref 1.16–3.74)
LYMPHOCYTES NFR BLD AUTO: 26.5 % (ref 20–55)
MCH RBC QN AUTO: 31.4 PG (ref 27–34)
MCHC RBC AUTO-ENTMCNC: 35.2 G/DL (ref 31–37)
MCV RBC AUTO: 89.2 FL (ref 79–99)
MONOCYTES # BLD AUTO: 0.65 X10(3)/MCL (ref 0.24–0.36)
MONOCYTES NFR BLD AUTO: 6.8 % (ref 4.7–12.5)
NEUTROPHILS # BLD AUTO: 6.01 X10(3)/MCL (ref 1.56–6.13)
NEUTROPHILS NFR BLD AUTO: 63.3 % (ref 37–73)
NRBC BLD AUTO-RTO: 0 %
PLATELET # BLD AUTO: 283 X10(3)/MCL (ref 140–371)
PMV BLD AUTO: 11.3 FL (ref 9.4–12.4)
POTASSIUM SERPL-SCNC: 4 MMOL/L (ref 3.5–5.1)
PROT SERPL-MCNC: 7 GM/DL (ref 6.3–8.2)
RBC # BLD AUTO: 4.36 X10(6)/MCL (ref 4–5.1)
SODIUM SERPL-SCNC: 142 MMOL/L (ref 135–145)
TRIGL SERPL-MCNC: 174 MG/DL (ref 30–200)
TSH SERPL-ACNC: 0.47 UIU/ML (ref 0.36–3.74)
WBC # SPEC AUTO: 9.51 X10(3)/MCL (ref 4–11.5)

## 2023-11-02 PROCEDURE — G0439 PPPS, SUBSEQ VISIT: HCPCS | Mod: ,,, | Performed by: FAMILY MEDICINE

## 2023-11-02 PROCEDURE — 99214 OFFICE O/P EST MOD 30 MIN: CPT | Mod: ,,, | Performed by: FAMILY MEDICINE

## 2023-11-02 PROCEDURE — 77067 SCR MAMMO BI INCL CAD: CPT | Mod: TC

## 2023-11-02 PROCEDURE — G0439 PR MEDICARE ANNUAL WELLNESS SUBSEQUENT VISIT: ICD-10-PCS | Mod: ,,, | Performed by: FAMILY MEDICINE

## 2023-11-02 PROCEDURE — 99214 PR OFFICE/OUTPT VISIT, EST, LEVL IV, 30-39 MIN: ICD-10-PCS | Mod: ,,, | Performed by: FAMILY MEDICINE

## 2023-11-02 RX ORDER — ALBUTEROL SULFATE 90 UG/1
2 AEROSOL, METERED RESPIRATORY (INHALATION) EVERY 6 HOURS PRN
COMMUNITY

## 2023-11-02 RX ORDER — AZITHROMYCIN 250 MG/1
250 TABLET, FILM COATED ORAL
COMMUNITY
Start: 2023-07-11

## 2023-11-02 RX ORDER — ACYCLOVIR 800 MG/1
800 TABLET ORAL 2 TIMES DAILY
Qty: 30 TABLET | Refills: 1 | Status: SHIPPED | OUTPATIENT
Start: 2023-11-02 | End: 2024-01-31

## 2023-11-02 RX ORDER — FLUTICASONE PROPIONATE AND SALMETEROL 250; 50 UG/1; UG/1
1 POWDER RESPIRATORY (INHALATION) 2 TIMES DAILY PRN
COMMUNITY

## 2023-11-02 NOTE — PROGRESS NOTES
"SUBJECTIVE:  Keisha Pelayo is a 68 y.o. female here for Medicare AWV      HPI  Patient here for annual Medicare wellness and follow-up on chronic medical conditions.  See assessment and plan for individual list of issues.  Kalias allergies, medications, history, and problem list were updated as appropriate.    Review of Systems   Constitutional:  Negative for activity change, appetite change, fatigue and fever.   HENT:  Negative for congestion, ear pain, hearing loss, sore throat and trouble swallowing.    Eyes:  Negative for photophobia, pain, redness and visual disturbance.   Respiratory:  Negative for cough, chest tightness, shortness of breath and wheezing.    Cardiovascular:  Negative for chest pain, palpitations and leg swelling.   Gastrointestinal:  Negative for abdominal distention, abdominal pain and blood in stool.   Endocrine: Negative for cold intolerance, heat intolerance, polydipsia and polyuria.   Genitourinary:  Negative for difficulty urinating, dysuria and frequency.   Musculoskeletal:  Negative for arthralgias, gait problem, joint swelling and myalgias.   Skin:  Negative for color change, pallor and rash.   Allergic/Immunologic: Negative.    Neurological:  Negative for dizziness, seizures, speech difficulty, weakness and headaches.   Hematological:  Negative for adenopathy. Does not bruise/bleed easily.   Psychiatric/Behavioral:  Negative for agitation and confusion.       A comprehensive review of symptoms was completed and negative except as noted above.    No results found for this or any previous visit (from the past 504 hour(s)).    OBJECTIVE:  Vital signs  Vitals:    11/02/23 1311   BP: 138/68   BP Location: Right arm   Patient Position: Sitting   BP Method: Large (Manual)   Pulse: 75   Temp: 96.8 °F (36 °C)   TempSrc: Temporal   SpO2: 99%   Weight: 97 kg (213 lb 12.8 oz)   Height: 5' 3.86" (1.622 m)        Physical Exam  Constitutional:       Appearance: Normal appearance.   HENT:      Head: " Normocephalic and atraumatic.      Right Ear: External ear normal.      Left Ear: External ear normal.      Nose: Nose normal.      Mouth/Throat:      Mouth: Mucous membranes are moist.      Pharynx: Oropharynx is clear.   Eyes:      Extraocular Movements: Extraocular movements intact.      Conjunctiva/sclera: Conjunctivae normal.      Pupils: Pupils are equal, round, and reactive to light.   Cardiovascular:      Rate and Rhythm: Normal rate and regular rhythm.      Heart sounds: Normal heart sounds. No murmur heard.  Pulmonary:      Effort: Pulmonary effort is normal.      Breath sounds: Normal breath sounds. No wheezing or rhonchi.   Abdominal:      General: Abdomen is flat.      Palpations: Abdomen is soft.   Musculoskeletal:         General: Normal range of motion.      Cervical back: Normal range of motion and neck supple.   Skin:     General: Skin is warm and dry.   Neurological:      General: No focal deficit present.      Mental Status: She is alert and oriented to person, place, and time.   Psychiatric:         Mood and Affect: Mood normal.         Behavior: Behavior normal.         Thought Content: Thought content normal.         Judgment: Judgment normal.          ASSESSMENT/PLAN:  1. Mixed hyperlipidemia  On atorvastatin.  Check lipids today  -     CBC Auto Differential; Future; Expected date: 11/02/2023  -     Comprehensive Metabolic Panel; Future; Expected date: 11/02/2023  -     Lipid Panel; Future; Expected date: 11/02/2023  -     TSH; Future; Expected date: 11/02/2023    2. Primary hypertension  Pressure controlled on enalapril and atenolol  -     CBC Auto Differential; Future; Expected date: 11/02/2023  -     Comprehensive Metabolic Panel; Future; Expected date: 11/02/2023  -     Lipid Panel; Future; Expected date: 11/02/2023  -     TSH; Future; Expected date: 11/02/2023    3. Chronic kidney disease, stage 3a  Check renal function today  -     CBC Auto Differential; Future; Expected date:  11/02/2023  -     Comprehensive Metabolic Panel; Future; Expected date: 11/02/2023  -     Lipid Panel; Future; Expected date: 11/02/2023  -     TSH; Future; Expected date: 11/02/2023    4. Systemic lupus erythematosus, unspecified SLE type, unspecified organ involvement status  Has been stable.  She is on Celebrex daily and takes Medrol Dosepaks is on occasion  -     CBC Auto Differential; Future; Expected date: 11/02/2023  -     Comprehensive Metabolic Panel; Future; Expected date: 11/02/2023  -     Lipid Panel; Future; Expected date: 11/02/2023  -     TSH; Future; Expected date: 11/02/2023    5. Type 2 diabetes mellitus without complication, without long-term current use of insulin  On metformin.  Check A1c today  -     CBC Auto Differential; Future; Expected date: 11/02/2023  -     Comprehensive Metabolic Panel; Future; Expected date: 11/02/2023  -     Lipid Panel; Future; Expected date: 11/02/2023  -     TSH; Future; Expected date: 11/02/2023    6. Acquired hypothyroidism  On levothyroxine 125 mcg daily.  Check TSH today  -     CBC Auto Differential; Future; Expected date: 11/02/2023  -     Comprehensive Metabolic Panel; Future; Expected date: 11/02/2023  -     Lipid Panel; Future; Expected date: 11/02/2023  -     TSH; Future; Expected date: 11/02/2023    7. Medicare annual wellness visit, subsequent  Mammogram was done today.  Up-to-date on Cologuard    I offered to discuss advanced care planning,  and how to pick a person who would make decisions for you if you were unable to make them for herself, called a health care power of , and what kind of decisions you might make such as use of life-sustaining treatments such as ventilators and tube feeding when faced with a life-limiting illness recorded on a living will that they will need to know.( How to be cared for as you near the end of your natural life)    Patient is interested in learning more about how to make advance directives.  I provided them  paperwork and offered to discuss this with them.     She is not receiving other Medicare services at this time  -     CBC Auto Differential; Future; Expected date: 11/02/2023  -     Comprehensive Metabolic Panel; Future; Expected date: 11/02/2023  -     Lipid Panel; Future; Expected date: 11/02/2023  -     TSH; Future; Expected date: 11/02/2023    8. Severe obesity (BMI 35.0-39.9) with comorbidity  Diet and exercise discussed  -     CBC Auto Differential; Future; Expected date: 11/02/2023  -     Comprehensive Metabolic Panel; Future; Expected date: 11/02/2023  -     Lipid Panel; Future; Expected date: 11/02/2023  -     TSH; Future; Expected date: 11/02/2023    9. Recurrent viral infection  Refilled acyclovir  -     CBC Auto Differential; Future; Expected date: 11/02/2023  -     Comprehensive Metabolic Panel; Future; Expected date: 11/02/2023  -     Lipid Panel; Future; Expected date: 11/02/2023  -     TSH; Future; Expected date: 11/02/2023  -     acyclovir (ZOVIRAX) 800 MG Tab; Take 1 tablet (800 mg total) by mouth 2 (two) times a day.  Dispense: 30 tablet; Refill: 1       10. COPD - mild and stable at this time  Follow Up:  Follow up in about 6 months (around 5/2/2024).

## 2023-11-07 ENCOUNTER — PATIENT MESSAGE (OUTPATIENT)
Dept: FAMILY MEDICINE | Facility: CLINIC | Age: 68
End: 2023-11-07
Payer: MEDICARE

## 2023-11-30 DIAGNOSIS — E11.9 TYPE 2 DIABETES MELLITUS WITHOUT COMPLICATION, WITHOUT LONG-TERM CURRENT USE OF INSULIN: ICD-10-CM

## 2023-12-01 RX ORDER — METFORMIN HYDROCHLORIDE 500 MG/1
500 TABLET ORAL
Qty: 90 TABLET | Refills: 0 | Status: SHIPPED | OUTPATIENT
Start: 2023-12-01 | End: 2024-03-13 | Stop reason: SDUPTHER

## 2023-12-20 DIAGNOSIS — E78.2 MIXED HYPERLIPIDEMIA: ICD-10-CM

## 2023-12-20 RX ORDER — ATORVASTATIN CALCIUM 20 MG/1
20 TABLET, FILM COATED ORAL NIGHTLY
Qty: 90 TABLET | Refills: 1 | Status: SHIPPED | OUTPATIENT
Start: 2023-12-20 | End: 2024-06-17

## 2023-12-26 ENCOUNTER — TELEPHONE (OUTPATIENT)
Dept: FAMILY MEDICINE | Facility: CLINIC | Age: 68
End: 2023-12-26
Payer: MEDICARE

## 2023-12-26 DIAGNOSIS — M32.9 SYSTEMIC LUPUS ERYTHEMATOSUS, UNSPECIFIED SLE TYPE, UNSPECIFIED ORGAN INVOLVEMENT STATUS: ICD-10-CM

## 2023-12-27 RX ORDER — METHYLPREDNISOLONE 4 MG/1
TABLET ORAL
Qty: 21 EACH | Refills: 0 | Status: SHIPPED | OUTPATIENT
Start: 2023-12-27 | End: 2024-03-13 | Stop reason: SDUPTHER

## 2024-03-13 DIAGNOSIS — F41.9 ANXIETY: ICD-10-CM

## 2024-03-13 DIAGNOSIS — M32.9 SYSTEMIC LUPUS ERYTHEMATOSUS, UNSPECIFIED SLE TYPE, UNSPECIFIED ORGAN INVOLVEMENT STATUS: ICD-10-CM

## 2024-03-13 DIAGNOSIS — I10 HYPERTENSION, UNSPECIFIED TYPE: ICD-10-CM

## 2024-03-13 DIAGNOSIS — E11.9 TYPE 2 DIABETES MELLITUS WITHOUT COMPLICATION, WITHOUT LONG-TERM CURRENT USE OF INSULIN: ICD-10-CM

## 2024-03-13 RX ORDER — ALPRAZOLAM 1 MG/1
1 TABLET ORAL EVERY MORNING
Qty: 30 TABLET | Refills: 2 | Status: SHIPPED | OUTPATIENT
Start: 2024-03-13

## 2024-03-13 RX ORDER — METHYLPREDNISOLONE 4 MG/1
TABLET ORAL
Qty: 21 EACH | Refills: 0 | Status: SHIPPED | OUTPATIENT
Start: 2024-03-13 | End: 2024-04-03

## 2024-03-13 RX ORDER — BUMETANIDE 1 MG/1
1 TABLET ORAL DAILY
Qty: 90 TABLET | Refills: 1 | Status: SHIPPED | OUTPATIENT
Start: 2024-03-13 | End: 2024-09-09

## 2024-03-13 RX ORDER — METFORMIN HYDROCHLORIDE 500 MG/1
500 TABLET ORAL DAILY
Qty: 90 TABLET | Refills: 1 | Status: SHIPPED | OUTPATIENT
Start: 2024-03-13 | End: 2024-05-08 | Stop reason: SDUPTHER

## 2024-03-13 RX ORDER — TRAMADOL HYDROCHLORIDE 50 MG/1
100 TABLET ORAL EVERY 6 HOURS PRN
Qty: 60 TABLET | Refills: 2 | Status: SHIPPED | OUTPATIENT
Start: 2024-03-13

## 2024-03-25 ENCOUNTER — OFFICE VISIT (OUTPATIENT)
Dept: FAMILY MEDICINE | Facility: CLINIC | Age: 69
End: 2024-03-25
Payer: MEDICARE

## 2024-03-25 DIAGNOSIS — M32.9 SYSTEMIC LUPUS ERYTHEMATOSUS, UNSPECIFIED SLE TYPE, UNSPECIFIED ORGAN INVOLVEMENT STATUS: ICD-10-CM

## 2024-03-25 DIAGNOSIS — N18.31 CHRONIC KIDNEY DISEASE, STAGE 3A: ICD-10-CM

## 2024-03-25 DIAGNOSIS — T78.40XA ALLERGIC REACTION, INITIAL ENCOUNTER: Primary | ICD-10-CM

## 2024-03-25 PROCEDURE — 99213 OFFICE O/P EST LOW 20 MIN: CPT | Mod: 95,,, | Performed by: FAMILY MEDICINE

## 2024-03-25 NOTE — PROGRESS NOTES
SUBJECTIVE:  Keisha Pelayo is a 68 y.o. female here for No chief complaint on file.      HPI  Patient requested a TeleMed visit today.  She has been stung by multiple mosquito is a few weeks ago.  She had had some urticaria from this but then had resolved.  Over the weekend the urticaria came back very severe in all the areas she has been stung.  Above her left eye became very swollen and even her left eye swelled and when she woke up Sunday morning she could not even open the eye.  She went to urgent care.  They were hesitant to treat her at all but they ended up giving her 8 mg dexamethasone shot.  They also told her to take the doxycycline she had a prescription for as well as her Medrol Dosepak that she had a prescription for.  This morning the swelling is much better.  She is still itching on all the spot she had had mosquito bites.      Keisha's allergies, medications, history, and problem list were updated as appropriate.    Review of Systems   See HPI  No results found for this or any previous visit (from the past 504 hour(s)).    OBJECTIVE:  Vital signs  There were no vitals filed for this visit.     Physical Exam this is a TeleMed visit.  Patient did have a little bit of redness around the left eye and it looks like it has been swelling but a swelling is now down.  A few other little urticarial lesions on his forehead    ASSESSMENT/PLAN:  1. Allergic reaction, initial encounter  This is very odd and delayed reaction to the mosquito bites.  I want her to continue the Medrol Waldemar along with Zyrtec twice a day and let me know if symptoms have not resolved by next week    2. Systemic lupus erythematosus, unspecified SLE type, unspecified organ involvement status  Stable    3. Chronic kidney disease, stage 3a         This is a TeleMed visit took place the patient's home in [facility].  This TeleMed visit took place between 2:00 p.m. and 2:15 p.m.  Follow Up:  No follow-ups on file.

## 2024-04-29 ENCOUNTER — TELEPHONE (OUTPATIENT)
Dept: FAMILY MEDICINE | Facility: CLINIC | Age: 69
End: 2024-04-29
Payer: MEDICARE

## 2024-04-29 DIAGNOSIS — E78.2 MIXED HYPERLIPIDEMIA: ICD-10-CM

## 2024-04-29 DIAGNOSIS — E03.9 ACQUIRED HYPOTHYROIDISM: ICD-10-CM

## 2024-04-29 DIAGNOSIS — I10 HYPERTENSION, UNSPECIFIED TYPE: ICD-10-CM

## 2024-04-29 RX ORDER — ATORVASTATIN CALCIUM 20 MG/1
20 TABLET, FILM COATED ORAL NIGHTLY
Qty: 90 TABLET | Refills: 1 | Status: SHIPPED | OUTPATIENT
Start: 2024-04-29 | End: 2024-10-26

## 2024-04-29 RX ORDER — LEVOTHYROXINE SODIUM 125 UG/1
125 TABLET ORAL
Qty: 90 TABLET | Refills: 1 | Status: SHIPPED | OUTPATIENT
Start: 2024-04-29

## 2024-04-29 RX ORDER — ENALAPRIL MALEATE 5 MG/1
5 TABLET ORAL DAILY
Qty: 90 TABLET | Refills: 1 | Status: SHIPPED | OUTPATIENT
Start: 2024-04-29

## 2024-04-29 NOTE — TELEPHONE ENCOUNTER
Levothyroxine 125mcg, QD   Enalapril 5mg, QD   Atorvastatin 20mg, QD          - [place]       [phone]

## 2024-05-08 ENCOUNTER — OFFICE VISIT (OUTPATIENT)
Dept: FAMILY MEDICINE | Facility: CLINIC | Age: 69
End: 2024-05-08
Payer: MEDICARE

## 2024-05-08 ENCOUNTER — CLINICAL SUPPORT (OUTPATIENT)
Dept: FAMILY MEDICINE | Facility: CLINIC | Age: 69
End: 2024-05-08
Payer: MEDICARE

## 2024-05-08 VITALS
OXYGEN SATURATION: 99 % | SYSTOLIC BLOOD PRESSURE: 129 MMHG | WEIGHT: 215.63 LBS | HEART RATE: 68 BPM | HEIGHT: 64 IN | TEMPERATURE: 97 F | BODY MASS INDEX: 36.81 KG/M2 | DIASTOLIC BLOOD PRESSURE: 60 MMHG

## 2024-05-08 DIAGNOSIS — N18.2 TYPE 2 DIABETES MELLITUS WITH STAGE 2 CHRONIC KIDNEY DISEASE, WITHOUT LONG-TERM CURRENT USE OF INSULIN: Primary | ICD-10-CM

## 2024-05-08 DIAGNOSIS — N18.31 CHRONIC KIDNEY DISEASE, STAGE 3A: ICD-10-CM

## 2024-05-08 DIAGNOSIS — M32.9 SYSTEMIC LUPUS ERYTHEMATOSUS, UNSPECIFIED SLE TYPE, UNSPECIFIED ORGAN INVOLVEMENT STATUS: ICD-10-CM

## 2024-05-08 DIAGNOSIS — N18.2 TYPE 2 DIABETES MELLITUS WITH STAGE 2 CHRONIC KIDNEY DISEASE, WITHOUT LONG-TERM CURRENT USE OF INSULIN: ICD-10-CM

## 2024-05-08 DIAGNOSIS — J44.9 CHRONIC OBSTRUCTIVE PULMONARY DISEASE, UNSPECIFIED COPD TYPE: ICD-10-CM

## 2024-05-08 DIAGNOSIS — E66.01 SEVERE OBESITY (BMI 35.0-39.9) WITH COMORBIDITY: ICD-10-CM

## 2024-05-08 DIAGNOSIS — E11.22 TYPE 2 DIABETES MELLITUS WITH STAGE 2 CHRONIC KIDNEY DISEASE, WITHOUT LONG-TERM CURRENT USE OF INSULIN: ICD-10-CM

## 2024-05-08 DIAGNOSIS — I10 PRIMARY HYPERTENSION: ICD-10-CM

## 2024-05-08 DIAGNOSIS — E78.2 MIXED HYPERLIPIDEMIA: ICD-10-CM

## 2024-05-08 DIAGNOSIS — E03.9 ACQUIRED HYPOTHYROIDISM: ICD-10-CM

## 2024-05-08 DIAGNOSIS — E11.22 TYPE 2 DIABETES MELLITUS WITH STAGE 2 CHRONIC KIDNEY DISEASE, WITHOUT LONG-TERM CURRENT USE OF INSULIN: Primary | ICD-10-CM

## 2024-05-08 DIAGNOSIS — E11.9 TYPE 2 DIABETES MELLITUS WITHOUT COMPLICATION, WITHOUT LONG-TERM CURRENT USE OF INSULIN: Primary | ICD-10-CM

## 2024-05-08 LAB
ALBUMIN SERPL-MCNC: 4.4 G/DL (ref 3.4–5)
ALBUMIN/GLOB SERPL: 1.7 RATIO
ALP SERPL-CCNC: 94 UNIT/L (ref 50–144)
ALT SERPL-CCNC: 26 UNIT/L (ref 1–45)
ANION GAP SERPL CALC-SCNC: 9 MEQ/L (ref 2–13)
AST SERPL-CCNC: 40 UNIT/L (ref 14–36)
BASOPHILS # BLD AUTO: 0.04 X10(3)/MCL (ref 0.01–0.08)
BASOPHILS NFR BLD AUTO: 0.4 % (ref 0.1–1.2)
BILIRUB SERPL-MCNC: 0.5 MG/DL (ref 0–1)
BUN SERPL-MCNC: 12 MG/DL (ref 7–20)
CALCIUM SERPL-MCNC: 8.9 MG/DL (ref 8.4–10.2)
CHLORIDE SERPL-SCNC: 105 MMOL/L (ref 98–110)
CO2 SERPL-SCNC: 27 MMOL/L (ref 21–32)
CREAT SERPL-MCNC: 0.74 MG/DL (ref 0.66–1.25)
CREAT UR-MCNC: 28 MG/DL (ref 45–106)
CREAT/UREA NIT SERPL: 16 (ref 12–20)
CRP SERPL-MCNC: <0.5 MG/DL
EOSINOPHIL # BLD AUTO: 0.18 X10(3)/MCL (ref 0.04–0.36)
EOSINOPHIL NFR BLD AUTO: 2 % (ref 0.7–7)
ERYTHROCYTE [DISTWIDTH] IN BLOOD BY AUTOMATED COUNT: 12.1 % (ref 11–14.5)
ERYTHROCYTE [SEDIMENTATION RATE] IN BLOOD: 4 MM/HR (ref 0–20)
GFR SERPLBLD CREATININE-BSD FMLA CKD-EPI: 88 ML/MIN/1.73/M2
GLOBULIN SER-MCNC: 2.6 GM/DL (ref 2–3.9)
GLUCOSE SERPL-MCNC: 107 MG/DL (ref 70–115)
HCT VFR BLD AUTO: 40.2 % (ref 36–48)
HGB BLD-MCNC: 14.3 G/DL (ref 11.8–16)
IMM GRANULOCYTES # BLD AUTO: 0.04 X10(3)/MCL (ref 0–0.03)
IMM GRANULOCYTES NFR BLD AUTO: 0.4 % (ref 0–0.5)
LEFT EYE DM RETINOPATHY: POSITIVE
LYMPHOCYTES # BLD AUTO: 2.38 X10(3)/MCL (ref 1.16–3.74)
LYMPHOCYTES NFR BLD AUTO: 25.9 % (ref 20–55)
MCH RBC QN AUTO: 32 PG (ref 27–34)
MCHC RBC AUTO-ENTMCNC: 35.6 G/DL (ref 31–37)
MCV RBC AUTO: 89.9 FL (ref 79–99)
MICROALBUMIN UR-MCNC: <5 UG/ML
MICROALBUMIN/CREAT RATIO PNL UR: ABNORMAL
MONOCYTES # BLD AUTO: 0.57 X10(3)/MCL (ref 0.24–0.36)
MONOCYTES NFR BLD AUTO: 6.2 % (ref 4.7–12.5)
NEUTROPHILS # BLD AUTO: 5.98 X10(3)/MCL (ref 1.56–6.13)
NEUTROPHILS NFR BLD AUTO: 65.1 % (ref 37–73)
NRBC BLD AUTO-RTO: 0 %
PLATELET # BLD AUTO: 299 X10(3)/MCL (ref 140–371)
PMV BLD AUTO: 11.4 FL (ref 9.4–12.4)
POTASSIUM SERPL-SCNC: 4 MMOL/L (ref 3.5–5.1)
PROT SERPL-MCNC: 7 GM/DL (ref 6.3–8.2)
RBC # BLD AUTO: 4.47 X10(6)/MCL (ref 4–5.1)
RIGHT EYE DM RETINOPATHY: POSITIVE
SODIUM SERPL-SCNC: 141 MMOL/L (ref 136–145)
TSH SERPL-ACNC: 0.42 UIU/ML (ref 0.36–3.74)
WBC # SPEC AUTO: 9.19 X10(3)/MCL (ref 4–11.5)

## 2024-05-08 PROCEDURE — 84443 ASSAY THYROID STIM HORMONE: CPT | Performed by: FAMILY MEDICINE

## 2024-05-08 PROCEDURE — 99214 OFFICE O/P EST MOD 30 MIN: CPT | Mod: ,,, | Performed by: FAMILY MEDICINE

## 2024-05-08 PROCEDURE — 82043 UR ALBUMIN QUANTITATIVE: CPT | Performed by: FAMILY MEDICINE

## 2024-05-08 PROCEDURE — 85025 COMPLETE CBC W/AUTO DIFF WBC: CPT | Performed by: FAMILY MEDICINE

## 2024-05-08 PROCEDURE — 80053 COMPREHEN METABOLIC PANEL: CPT | Performed by: FAMILY MEDICINE

## 2024-05-08 PROCEDURE — 86140 C-REACTIVE PROTEIN: CPT | Performed by: FAMILY MEDICINE

## 2024-05-08 PROCEDURE — 85652 RBC SED RATE AUTOMATED: CPT | Performed by: FAMILY MEDICINE

## 2024-05-08 RX ORDER — HYDROXYZINE HYDROCHLORIDE 25 MG/1
25 TABLET, FILM COATED ORAL 3 TIMES DAILY PRN
Qty: 90 TABLET | Refills: 1 | Status: SHIPPED | OUTPATIENT
Start: 2024-05-08

## 2024-05-08 RX ORDER — METFORMIN HYDROCHLORIDE 500 MG/1
500 TABLET ORAL DAILY
Qty: 90 TABLET | Refills: 3 | Status: SHIPPED | OUTPATIENT
Start: 2024-05-08 | End: 2025-05-08

## 2024-05-08 RX ORDER — METHYLPREDNISOLONE 4 MG/1
TABLET ORAL
Qty: 21 EACH | Refills: 0 | Status: SHIPPED | OUTPATIENT
Start: 2024-05-08 | End: 2024-05-29

## 2024-05-08 NOTE — PROGRESS NOTES
"SUBJECTIVE:  Keisha Pelayo is a 68 y.o. female here for Follow-up (6mth f/u urine microalbumin)      HPI  Patient here for follow-up on chronic medical conditions.  See assessment and plan for individual list of issues.  Since her last visit she continues to have itching on her skin at night especially.  This is similar to when she was 1st diagnosed with lupus.  Keisha's allergies, medications, history, and problem list were updated as appropriate.    Review of Systems   See HPI    No results found for this or any previous visit (from the past 504 hour(s)).    OBJECTIVE:  Vital signs  Vitals:    05/08/24 1302   BP: 129/60   BP Location: Right arm   Pulse: 68   Temp: 96.9 °F (36.1 °C)   TempSrc: Oral   SpO2: 99%   Weight: 97.8 kg (215 lb 9.6 oz)   Height: 5' 3.86" (1.622 m)        Physical Exam heart with a regular rate and rhythm, she does have a little bit of the malar rash to her cheeks and redness to her upper torso    ASSESSMENT/PLAN:  1. Type 2 diabetes mellitus without complication, without long-term current use of insulin  Check A1c today.  Continue metformin  -     Microalbumin/Creatinine Ratio, Urine  -     metFORMIN (GLUCOPHAGE) 500 MG tablet; Take 1 tablet (500 mg total) by mouth once daily.  Dispense: 90 tablet; Refill: 3  -     CBC Auto Differential; Future; Expected date: 05/08/2024  -     Comprehensive Metabolic Panel; Future; Expected date: 05/08/2024  -     Sedimentation rate; Future; Expected date: 05/08/2024  -     C-Reactive Protein; Future; Expected date: 05/08/2024  -     TSH; Future; Expected date: 05/08/2024    2. Mixed hyperlipidemia  Continue atorvastatin and check lipids today  -     CBC Auto Differential; Future; Expected date: 05/08/2024  -     Comprehensive Metabolic Panel; Future; Expected date: 05/08/2024  -     Sedimentation rate; Future; Expected date: 05/08/2024  -     C-Reactive Protein; Future; Expected date: 05/08/2024  -     TSH; Future; Expected date: 05/08/2024    3. Primary " hypertension  Blood pressure well controlled on enalapril  -     CBC Auto Differential; Future; Expected date: 05/08/2024  -     Comprehensive Metabolic Panel; Future; Expected date: 05/08/2024  -     Sedimentation rate; Future; Expected date: 05/08/2024  -     C-Reactive Protein; Future; Expected date: 05/08/2024  -     TSH; Future; Expected date: 05/08/2024    4. Chronic kidney disease, stage 3a  Check renal function today  -     CBC Auto Differential; Future; Expected date: 05/08/2024  -     Comprehensive Metabolic Panel; Future; Expected date: 05/08/2024  -     Sedimentation rate; Future; Expected date: 05/08/2024  -     C-Reactive Protein; Future; Expected date: 05/08/2024  -     TSH; Future; Expected date: 05/08/2024    5. Systemic lupus erythematosus, unspecified SLE type, unspecified organ involvement status  Sed rate and CRP today.  I wonder if some of this pruritic rash she is having its maybe a manifestation of lupus  -     CBC Auto Differential; Future; Expected date: 05/08/2024  -     Comprehensive Metabolic Panel; Future; Expected date: 05/08/2024  -     Sedimentation rate; Future; Expected date: 05/08/2024  -     C-Reactive Protein; Future; Expected date: 05/08/2024  -     TSH; Future; Expected date: 05/08/2024    6. Acquired hypothyroidism  Check TSH today.  Continue levothyroxine 125 mcg daily  -     CBC Auto Differential; Future; Expected date: 05/08/2024  -     Comprehensive Metabolic Panel; Future; Expected date: 05/08/2024  -     Sedimentation rate; Future; Expected date: 05/08/2024  -     C-Reactive Protein; Future; Expected date: 05/08/2024  -     TSH; Future; Expected date: 05/08/2024    7. Severe obesity (BMI 35.0-39.9) with comorbidity  Increase walking  -     CBC Auto Differential; Future; Expected date: 05/08/2024  -     Comprehensive Metabolic Panel; Future; Expected date: 05/08/2024  -     Sedimentation rate; Future; Expected date: 05/08/2024  -     C-Reactive Protein; Future; Expected  date: 05/08/2024  -     TSH; Future; Expected date: 05/08/2024    8. Chronic obstructive pulmonary disease, unspecified COPD type  Stable  -     CBC Auto Differential; Future; Expected date: 05/08/2024  -     Comprehensive Metabolic Panel; Future; Expected date: 05/08/2024  -     Sedimentation rate; Future; Expected date: 05/08/2024  -     C-Reactive Protein; Future; Expected date: 05/08/2024  -     TSH; Future; Expected date: 05/08/2024      -     CBC Auto Differential; Future; Expected date: 05/08/2024  -     Comprehensive Metabolic Panel; Future; Expected date: 05/08/2024  -     Sedimentation rate; Future; Expected date: 05/08/2024  -     C-Reactive Protein; Future; Expected date: 05/08/2024  -     TSH; Future; Expected date: 05/08/2024    Other orders  -     hydrOXYzine HCL (ATARAX) 25 MG tablet; Take 1 tablet (25 mg total) by mouth 3 (three) times daily as needed for Itching.  Dispense: 90 tablet; Refill: 1  -     methylPREDNISolone (MEDROL DOSEPACK) 4 mg tablet; use as directed  Dispense: 21 each; Refill: 0         Follow Up:  Follow up in about 6 months (around 11/8/2024) for wellness.

## 2024-05-13 ENCOUNTER — PATIENT OUTREACH (OUTPATIENT)
Dept: ADMINISTRATIVE | Facility: HOSPITAL | Age: 69
End: 2024-05-13
Payer: MEDICARE

## 2024-05-13 NOTE — PROGRESS NOTES
Health Maintenance Topic(s) Outreach Outcomes & Actions Taken:         Additional Notes:  EyePACS exam sent to Dr. White's review.

## 2024-08-19 ENCOUNTER — TELEPHONE (OUTPATIENT)
Dept: FAMILY MEDICINE | Facility: CLINIC | Age: 69
End: 2024-08-19
Payer: MEDICARE

## 2024-08-19 DIAGNOSIS — F41.9 ANXIETY: ICD-10-CM

## 2024-08-19 DIAGNOSIS — E11.9 TYPE 2 DIABETES MELLITUS WITHOUT COMPLICATION, WITHOUT LONG-TERM CURRENT USE OF INSULIN: ICD-10-CM

## 2024-08-19 RX ORDER — ALPRAZOLAM 1 MG/1
1 TABLET ORAL EVERY MORNING
Qty: 30 TABLET | Refills: 2 | Status: CANCELLED | OUTPATIENT
Start: 2024-08-19

## 2024-08-19 RX ORDER — ALPRAZOLAM 1 MG/1
1 TABLET ORAL EVERY MORNING
Qty: 30 TABLET | Refills: 2 | Status: SHIPPED | OUTPATIENT
Start: 2024-08-19

## 2024-08-19 RX ORDER — METFORMIN HYDROCHLORIDE 500 MG/1
500 TABLET ORAL DAILY
Qty: 90 TABLET | Refills: 3 | Status: SHIPPED | OUTPATIENT
Start: 2024-08-19 | End: 2025-08-19

## 2024-10-15 ENCOUNTER — TELEPHONE (OUTPATIENT)
Dept: FAMILY MEDICINE | Facility: CLINIC | Age: 69
End: 2024-10-15
Payer: MEDICARE

## 2024-10-15 DIAGNOSIS — Z12.31 BREAST CANCER SCREENING BY MAMMOGRAM: ICD-10-CM

## 2024-10-15 DIAGNOSIS — Z12.31 SCREENING MAMMOGRAM FOR BREAST CANCER: Primary | ICD-10-CM

## 2024-10-30 ENCOUNTER — TELEPHONE (OUTPATIENT)
Dept: FAMILY MEDICINE | Facility: CLINIC | Age: 69
End: 2024-10-30
Payer: MEDICARE

## 2024-10-30 DIAGNOSIS — E03.9 ACQUIRED HYPOTHYROIDISM: ICD-10-CM

## 2024-10-30 DIAGNOSIS — E78.2 MIXED HYPERLIPIDEMIA: ICD-10-CM

## 2024-10-30 DIAGNOSIS — B34.9 RECURRENT VIRAL INFECTION: ICD-10-CM

## 2024-10-30 RX ORDER — ATORVASTATIN CALCIUM 20 MG/1
20 TABLET, FILM COATED ORAL NIGHTLY
Qty: 90 TABLET | Refills: 1 | Status: SHIPPED | OUTPATIENT
Start: 2024-10-30 | End: 2024-10-30

## 2024-10-30 RX ORDER — ATORVASTATIN CALCIUM 20 MG/1
20 TABLET, FILM COATED ORAL NIGHTLY
Qty: 90 TABLET | Refills: 1 | Status: SHIPPED | OUTPATIENT
Start: 2024-10-30 | End: 2025-04-28

## 2024-10-30 RX ORDER — LEVOTHYROXINE SODIUM 125 UG/1
125 TABLET ORAL
Qty: 90 TABLET | Refills: 1 | Status: SHIPPED | OUTPATIENT
Start: 2024-10-30 | End: 2024-10-30

## 2024-10-30 RX ORDER — LEVOTHYROXINE SODIUM 125 UG/1
125 TABLET ORAL
Qty: 90 TABLET | Refills: 1 | Status: SHIPPED | OUTPATIENT
Start: 2024-10-30

## 2024-10-30 RX ORDER — ACYCLOVIR 800 MG/1
800 TABLET ORAL 2 TIMES DAILY
Qty: 60 TABLET | Refills: 2 | Status: SHIPPED | OUTPATIENT
Start: 2024-10-30 | End: 2024-10-30

## 2024-10-30 RX ORDER — ACYCLOVIR 800 MG/1
800 TABLET ORAL 2 TIMES DAILY
Qty: 60 TABLET | Refills: 2 | Status: SHIPPED | OUTPATIENT
Start: 2024-10-30 | End: 2025-01-28

## 2024-11-05 ENCOUNTER — PATIENT OUTREACH (OUTPATIENT)
Dept: ADMINISTRATIVE | Facility: HOSPITAL | Age: 69
End: 2024-11-05
Payer: MEDICARE

## 2024-11-05 NOTE — LETTER
November 5, 2024    Keisha Pelayo  [address]  [place] [address]             [facility]  [address]  [facility] [address]  Phone: [phone] Dear [facility] is committed to your overall health. Periodically we review the health information in your chart to make sure you are up to date on all of your recommended tests and/or procedures.       Our review of your chart shows that you may be due for       [facility] Score: 4     Osteoporosis Screening  Hemoglobin A1c  Lipid Panel  Foot Exam    Influenza Vaccine                If you have had any of the above done at another facility, please let us know and we will request a copy of the report to update your [facility] record.       At your convenience I would like to speak to you to help get these items scheduled (if needed) and also see if there is anything else we can do to help you. Please send me a message via your patient portal or give me a call at [phone].  I am looking forward to speaking with you soon.     Sincerely,      Tona Smallwood, Care Coordinator  Your [facility] Team

## 2024-11-05 NOTE — PROGRESS NOTES
Health Maintenance Topic(s) Outreach Outcomes & Actions Taken:         Additional Notes:  Attempted to contact patient to introduce myself and see if I can assist with setting up Health Maintenance topics or education/resources.     Mailing letter as well.

## 2024-11-26 ENCOUNTER — HOSPITAL ENCOUNTER (OUTPATIENT)
Dept: RADIOLOGY | Facility: HOSPITAL | Age: 69
Discharge: HOME | End: 2024-11-26
Attending: FAMILY MEDICINE
Payer: MEDICARE

## 2024-11-26 ENCOUNTER — OFFICE VISIT (OUTPATIENT)
Dept: FAMILY MEDICINE | Facility: CLINIC | Age: 69
End: 2024-11-26
Payer: MEDICARE

## 2024-11-26 VITALS
HEIGHT: 64 IN | WEIGHT: 215.19 LBS | SYSTOLIC BLOOD PRESSURE: 132 MMHG | TEMPERATURE: 97 F | OXYGEN SATURATION: 96 % | HEART RATE: 69 BPM | BODY MASS INDEX: 36.74 KG/M2 | DIASTOLIC BLOOD PRESSURE: 68 MMHG

## 2024-11-26 DIAGNOSIS — E11.9 TYPE 2 DIABETES MELLITUS WITHOUT COMPLICATION, WITHOUT LONG-TERM CURRENT USE OF INSULIN: ICD-10-CM

## 2024-11-26 DIAGNOSIS — E11.22 TYPE 2 DIABETES MELLITUS WITH STAGE 2 CHRONIC KIDNEY DISEASE, WITHOUT LONG-TERM CURRENT USE OF INSULIN: ICD-10-CM

## 2024-11-26 DIAGNOSIS — Z12.31 BREAST CANCER SCREENING BY MAMMOGRAM: ICD-10-CM

## 2024-11-26 DIAGNOSIS — E78.2 MIXED HYPERLIPIDEMIA: ICD-10-CM

## 2024-11-26 DIAGNOSIS — M32.9 SYSTEMIC LUPUS ERYTHEMATOSUS, UNSPECIFIED SLE TYPE, UNSPECIFIED ORGAN INVOLVEMENT STATUS: ICD-10-CM

## 2024-11-26 DIAGNOSIS — Z12.11 COLON CANCER SCREENING: ICD-10-CM

## 2024-11-26 DIAGNOSIS — N18.2 TYPE 2 DIABETES MELLITUS WITH STAGE 2 CHRONIC KIDNEY DISEASE, WITHOUT LONG-TERM CURRENT USE OF INSULIN: ICD-10-CM

## 2024-11-26 DIAGNOSIS — Z00.00 MEDICARE ANNUAL WELLNESS VISIT, SUBSEQUENT: Primary | ICD-10-CM

## 2024-11-26 DIAGNOSIS — I10 HYPERTENSION, UNSPECIFIED TYPE: ICD-10-CM

## 2024-11-26 DIAGNOSIS — I10 PRIMARY HYPERTENSION: ICD-10-CM

## 2024-11-26 DIAGNOSIS — E03.9 ACQUIRED HYPOTHYROIDISM: ICD-10-CM

## 2024-11-26 DIAGNOSIS — J44.9 CHRONIC OBSTRUCTIVE PULMONARY DISEASE, UNSPECIFIED COPD TYPE: ICD-10-CM

## 2024-11-26 DIAGNOSIS — N18.31 CHRONIC KIDNEY DISEASE, STAGE 3A: ICD-10-CM

## 2024-11-26 DIAGNOSIS — F41.9 ANXIETY: ICD-10-CM

## 2024-11-26 LAB
ALBUMIN SERPL-MCNC: 4.5 G/DL (ref 3.4–5)
ALBUMIN/GLOB SERPL: 2 RATIO
ALP SERPL-CCNC: 87 UNIT/L (ref 50–144)
ALT SERPL-CCNC: 20 UNIT/L (ref 1–45)
ANION GAP SERPL CALC-SCNC: 7 MEQ/L (ref 2–13)
AST SERPL-CCNC: 36 UNIT/L (ref 14–36)
BASOPHILS # BLD AUTO: 0.05 X10(3)/MCL (ref 0.01–0.08)
BASOPHILS NFR BLD AUTO: 0.4 % (ref 0.1–1.2)
BILIRUB SERPL-MCNC: 0.7 MG/DL (ref 0–1)
BUN SERPL-MCNC: 14 MG/DL (ref 7–20)
CALCIUM SERPL-MCNC: 9.3 MG/DL (ref 8.4–10.2)
CHLORIDE SERPL-SCNC: 103 MMOL/L (ref 98–110)
CHOLEST SERPL-MCNC: 202 MG/DL (ref 0–200)
CO2 SERPL-SCNC: 28 MMOL/L (ref 21–32)
CREAT SERPL-MCNC: 0.95 MG/DL (ref 0.66–1.25)
CREAT/UREA NIT SERPL: 15 (ref 12–20)
CRP SERPL-MCNC: <0.5 MG/DL
EOSINOPHIL # BLD AUTO: 0.21 X10(3)/MCL (ref 0.04–0.36)
EOSINOPHIL NFR BLD AUTO: 1.8 % (ref 0.7–7)
ERYTHROCYTE [DISTWIDTH] IN BLOOD BY AUTOMATED COUNT: 11.9 % (ref 11–14.5)
ERYTHROCYTE [SEDIMENTATION RATE] IN BLOOD: 2 MM/HR (ref 0–20)
EST. AVERAGE GLUCOSE BLD GHB EST-MCNC: 105.4 MG/DL (ref 70–115)
GFR SERPLBLD CREATININE-BSD FMLA CKD-EPI: 65 ML/MIN/1.73/M2
GLOBULIN SER-MCNC: 2.3 GM/DL (ref 2–3.9)
GLUCOSE SERPL-MCNC: 86 MG/DL (ref 70–115)
HBA1C MFR BLD: 5.3 % (ref 4–6)
HCT VFR BLD AUTO: 38.8 % (ref 36–48)
HDLC SERPL-MCNC: 64 MG/DL (ref 40–60)
HGB BLD-MCNC: 13.4 G/DL (ref 11.8–16)
IMM GRANULOCYTES # BLD AUTO: 0.04 X10(3)/MCL (ref 0–0.03)
IMM GRANULOCYTES NFR BLD AUTO: 0.3 % (ref 0–0.5)
LDLC SERPL DIRECT ASSAY-SCNC: 98.3 MG/DL (ref 30–100)
LYMPHOCYTES # BLD AUTO: 3.23 X10(3)/MCL (ref 1.16–3.74)
LYMPHOCYTES NFR BLD AUTO: 28.1 % (ref 20–55)
MCH RBC QN AUTO: 30.7 PG (ref 27–34)
MCHC RBC AUTO-ENTMCNC: 34.5 G/DL (ref 31–37)
MCV RBC AUTO: 89 FL (ref 79–99)
MONOCYTES # BLD AUTO: 0.84 X10(3)/MCL (ref 0.24–0.36)
MONOCYTES NFR BLD AUTO: 7.3 % (ref 4.7–12.5)
NEUTROPHILS # BLD AUTO: 7.11 X10(3)/MCL (ref 1.56–6.13)
NEUTROPHILS NFR BLD AUTO: 62.1 % (ref 37–73)
NRBC BLD AUTO-RTO: 0 %
PLATELET # BLD AUTO: 281 X10(3)/MCL (ref 140–371)
PMV BLD AUTO: 10.5 FL (ref 9.4–12.4)
POTASSIUM SERPL-SCNC: 3.5 MMOL/L (ref 3.5–5.1)
PROT SERPL-MCNC: 6.8 GM/DL (ref 6.3–8.2)
RBC # BLD AUTO: 4.36 X10(6)/MCL (ref 4–5.1)
SODIUM SERPL-SCNC: 138 MMOL/L (ref 136–145)
TRIGL SERPL-MCNC: 155 MG/DL (ref 30–200)
TSH SERPL-ACNC: 0.15 UIU/ML (ref 0.36–3.74)
WBC # BLD AUTO: 11.48 X10(3)/MCL (ref 4–11.5)

## 2024-11-26 PROCEDURE — G0439 PPPS, SUBSEQ VISIT: HCPCS | Mod: ,,, | Performed by: FAMILY MEDICINE

## 2024-11-26 PROCEDURE — 99214 OFFICE O/P EST MOD 30 MIN: CPT | Mod: ,,, | Performed by: FAMILY MEDICINE

## 2024-11-26 PROCEDURE — 99497 ADVNCD CARE PLAN 30 MIN: CPT | Mod: 33,,, | Performed by: FAMILY MEDICINE

## 2024-11-26 PROCEDURE — 77063 BREAST TOMOSYNTHESIS BI: CPT | Mod: TC

## 2024-11-26 RX ORDER — METHYLPREDNISOLONE 4 MG/1
TABLET ORAL
Qty: 21 EACH | Refills: 0 | Status: SHIPPED | OUTPATIENT
Start: 2024-11-26 | End: 2024-12-17

## 2024-11-26 RX ORDER — AZITHROMYCIN 250 MG/1
250 TABLET, FILM COATED ORAL DAILY
Qty: 90 TABLET | Refills: 1 | Status: SHIPPED | OUTPATIENT
Start: 2024-11-26

## 2024-11-26 RX ORDER — TRAMADOL HYDROCHLORIDE 50 MG/1
100 TABLET ORAL EVERY 6 HOURS PRN
Qty: 60 TABLET | Refills: 2 | Status: SHIPPED | OUTPATIENT
Start: 2024-11-26

## 2024-11-26 RX ORDER — ALBUTEROL SULFATE 90 UG/1
2 INHALANT RESPIRATORY (INHALATION) EVERY 6 HOURS PRN
Qty: 18 G | Refills: 3 | Status: SHIPPED | OUTPATIENT
Start: 2024-11-26

## 2024-11-26 RX ORDER — LEVOTHYROXINE SODIUM 125 UG/1
125 TABLET ORAL
Qty: 90 TABLET | Refills: 1 | Status: SHIPPED | OUTPATIENT
Start: 2024-11-26

## 2024-11-26 RX ORDER — METFORMIN HYDROCHLORIDE 500 MG/1
500 TABLET ORAL DAILY
Qty: 90 TABLET | Refills: 3 | Status: SHIPPED | OUTPATIENT
Start: 2024-11-26 | End: 2025-11-26

## 2024-11-26 RX ORDER — ENALAPRIL MALEATE 5 MG/1
5 TABLET ORAL DAILY
Qty: 90 TABLET | Refills: 1 | Status: SHIPPED | OUTPATIENT
Start: 2024-11-26

## 2024-11-26 RX ORDER — AZITHROMYCIN 250 MG/1
250 TABLET, FILM COATED ORAL
Status: CANCELLED | OUTPATIENT
Start: 2024-11-26

## 2024-11-26 RX ORDER — ALPRAZOLAM 1 MG/1
1 TABLET ORAL EVERY MORNING
Qty: 30 TABLET | Refills: 2 | Status: SHIPPED | OUTPATIENT
Start: 2024-11-26

## 2024-11-26 NOTE — PROGRESS NOTES
SUBJECTIVE:  Keisha Pelayo is a 69 y.o. female here for Medicare AWV Follow Up      HPI  Patient is here for annual Medicare wellness and follow-up on chronic medical condition.  See assessment and plan for individual list of issues.  She is doing well at this time but has had some recent flares of lupus  Keisha's allergies, medications, history, and problem list were updated as appropriate.    Review of Systems   Constitutional:  Positive for fatigue. Negative for activity change, appetite change and fever.   HENT:  Negative for congestion, ear pain, hearing loss, sore throat and trouble swallowing.    Eyes:  Negative for photophobia, pain, redness and visual disturbance.   Respiratory:  Negative for cough, chest tightness, shortness of breath and wheezing.    Cardiovascular:  Negative for chest pain, palpitations and leg swelling.   Gastrointestinal:  Negative for abdominal distention, abdominal pain and blood in stool.   Endocrine: Negative for cold intolerance, heat intolerance, polydipsia and polyuria.   Genitourinary:  Negative for difficulty urinating, dysuria and frequency.   Musculoskeletal:  Positive for arthralgias. Negative for gait problem, joint swelling and myalgias.   Skin:  Negative for color change, pallor and rash.   Allergic/Immunologic: Negative.    Neurological:  Negative for dizziness, seizures, speech difficulty, weakness and headaches.   Hematological:  Negative for adenopathy. Does not bruise/bleed easily.   Psychiatric/Behavioral:  Negative for agitation and confusion.       A comprehensive review of symptoms was completed and negative except as noted above.    Recent Results (from the past 3 weeks)   Comprehensive Metabolic Panel    Collection Time: 11/26/24  3:12 PM   Result Value Ref Range    Sodium 138 136 - 145 mmol/L    Potassium 3.5 3.5 - 5.1 mmol/L    Chloride 103 98 - 110 mmol/L    CO2 28 21 - 32 mmol/L    Glucose 86 70 - 115 mg/dL    Blood Urea Nitrogen 14 7.0 - 20.0 mg/dL     Creatinine 0.95 0.66 - 1.25 mg/dL    Calcium 9.3 8.4 - 10.2 mg/dL    Protein Total 6.8 6.3 - 8.2 gm/dL    Albumin 4.5 3.4 - 5.0 g/dL    Globulin 2.3 2.0 - 3.9 gm/dL    Albumin/Globulin Ratio 2.0 ratio    Bilirubin Total 0.7 0.0 - 1.0 mg/dL    ALP 87 50 - 144 unit/L    ALT 20 1 - 45 unit/L    AST 36 14 - 36 unit/L    eGFR 65 mL/min/1.73/m2    Anion Gap 7.0 2.0 - 13.0 mEq/L    BUN/Creatinine Ratio 15 12 - 20   C-Reactive Protein    Collection Time: 11/26/24  3:12 PM   Result Value Ref Range    CRP <0.50 <=0.90 mg/dL   Hemoglobin A1C    Collection Time: 11/26/24  3:12 PM   Result Value Ref Range    Hemoglobin A1c 5.3 4.0 - 6.0 %    Estimated Average Glucose 105.4 70.0 - 115.0 mg/dL   Lipid Panel    Collection Time: 11/26/24  3:12 PM   Result Value Ref Range    Cholesterol Total 202 (H) 0 - 200 mg/dL    HDL Cholesterol 64 (H) 40 - 60 mg/dL    Triglyceride 155 30 - 200 mg/dL    LDL Cholesterol Direct 98.3 30.0 - 100.0 mg/dL   Sedimentation rate    Collection Time: 11/26/24  3:12 PM   Result Value Ref Range    Sed Rate 2 0 - 20 mm/hr   CBC with Differential    Collection Time: 11/26/24  3:12 PM   Result Value Ref Range    WBC 11.48 4.00 - 11.50 x10(3)/mcL    RBC 4.36 4.00 - 5.10 x10(6)/mcL    Hgb 13.4 11.8 - 16.0 g/dL    Hct 38.8 36.0 - 48.0 %    MCV 89.0 79.0 - 99.0 fL    MCH 30.7 27.0 - 34.0 pg    MCHC 34.5 31.0 - 37.0 g/dL    RDW 11.9 11.0 - 14.5 %    Platelet 281 140 - 371 x10(3)/mcL    MPV 10.5 9.4 - 12.4 fL    Neut % 62.1 37 - 73 %    Lymph % 28.1 20 - 55 %    Mono % 7.3 4.7 - 12.5 %    Eos % 1.8 0.7 - 7 %    Basophil % 0.4 0.1 - 1.2 %    Lymph # 3.23 1.16 - 3.74 x10(3)/mcL    Neut # 7.11 (H) 1.56 - 6.13 x10(3)/mcL    Mono # 0.84 (H) 0.24 - 0.36 x10(3)/mcL    Eos # 0.21 0.04 - 0.36 x10(3)/mcL    Baso # 0.05 0.01 - 0.08 x10(3)/mcL    IG# 0.04 (H) 0.0001 - 0.031 x10(3)/mcL    IG% 0.3 0 - 0.5 %    NRBC% 0.0 <=1 %       OBJECTIVE:  Vital signs  Vitals:    11/26/24 1427   BP: 132/68   BP Location: Right arm   Patient  "Position: Sitting   Pulse: 69   Temp: 97.4 °F (36.3 °C)   TempSrc: Oral   SpO2: 96%   Weight: 97.6 kg (215 lb 3.2 oz)   Height: 5' 3.86" (1.622 m)        Physical Exam  Constitutional:       Appearance: Normal appearance.   HENT:      Head: Normocephalic and atraumatic.      Right Ear: External ear normal.      Left Ear: External ear normal.      Nose: Nose normal.      Mouth/Throat:      Mouth: Mucous membranes are moist.      Pharynx: Oropharynx is clear.   Eyes:      Extraocular Movements: Extraocular movements intact.      Conjunctiva/sclera: Conjunctivae normal.      Pupils: Pupils are equal, round, and reactive to light.   Cardiovascular:      Rate and Rhythm: Normal rate and regular rhythm.      Heart sounds: Normal heart sounds. No murmur heard.  Pulmonary:      Effort: Pulmonary effort is normal.      Breath sounds: Normal breath sounds. No wheezing or rhonchi.   Abdominal:      General: Abdomen is flat.      Palpations: Abdomen is soft.   Musculoskeletal:         General: Normal range of motion.      Cervical back: Normal range of motion and neck supple.   Skin:     General: Skin is warm and dry.   Neurological:      General: No focal deficit present.      Mental Status: She is alert and oriented to person, place, and time.   Psychiatric:         Mood and Affect: Mood normal.         Behavior: Behavior normal.         Thought Content: Thought content normal.         Judgment: Judgment normal.          ASSESSMENT/PLAN:  1. Medicare annual wellness visit, subsequent  I offered to discuss advanced care planning,  and how to pick a person who would make decisions for you if you were unable to make them for herself, called a health care power of , and what kind of decisions you might make such as use of life-sustaining treatments such as ventilators and tube feeding when faced with a life-limiting illness recorded on a living will that they will need to know.( How to be cared for as you near the end of " your natural life)    Patient is interested in learning more about how to make advance directives.  I provided them paperwork and offered to discuss this with them.     Getting mammogram today  She will be due Coluard next month    2. Type 2 diabetes mellitus without complication, without long-term current use of insulin  Check A1c today  -     metFORMIN (GLUCOPHAGE) 500 MG tablet; Take 1 tablet (500 mg total) by mouth once daily.  Dispense: 90 tablet; Refill: 3  -     CBC Auto Differential; Future; Expected date: 11/26/2024  -     Comprehensive Metabolic Panel; Future; Expected date: 11/26/2024  -     C-Reactive Protein; Future; Expected date: 11/26/2024  -     Hemoglobin A1C; Future; Expected date: 11/26/2024  -     Lipid Panel; Future; Expected date: 11/26/2024  -     TSH; Future; Expected date: 11/26/2024  -     Sedimentation rate; Future; Expected date: 11/26/2024  -     Microalbumin/Creatinine Ratio, Urine; Future; Expected date: 11/26/2024    3. Acquired hypothyroidism  On levothyroxine 125 mcg  -     levothyroxine (SYNTHROID) 125 MCG tablet; Take 1 tablet (125 mcg total) by mouth before breakfast.  Dispense: 90 tablet; Refill: 1  -     CBC Auto Differential; Future; Expected date: 11/26/2024  -     Comprehensive Metabolic Panel; Future; Expected date: 11/26/2024  -     C-Reactive Protein; Future; Expected date: 11/26/2024  -     Hemoglobin A1C; Future; Expected date: 11/26/2024  -     Lipid Panel; Future; Expected date: 11/26/2024  -     TSH; Future; Expected date: 11/26/2024  -     Sedimentation rate; Future; Expected date: 11/26/2024  -     Microalbumin/Creatinine Ratio, Urine; Future; Expected date: 11/26/2024    4. Hypertension, unspecified type  Well controlled on enalapril  -     enalapril (VASOTEC) 5 MG tablet; Take 1 tablet (5 mg total) by mouth once daily.  Dispense: 90 tablet; Refill: 1  -     CBC Auto Differential; Future; Expected date: 11/26/2024  -     Comprehensive Metabolic Panel; Future;  Expected date: 11/26/2024  -     C-Reactive Protein; Future; Expected date: 11/26/2024  -     Hemoglobin A1C; Future; Expected date: 11/26/2024  -     Lipid Panel; Future; Expected date: 11/26/2024  -     TSH; Future; Expected date: 11/26/2024  -     Sedimentation rate; Future; Expected date: 11/26/2024  -     Microalbumin/Creatinine Ratio, Urine; Future; Expected date: 11/26/2024    5. Anxiety    -     ALPRAZolam (XANAX) 1 MG tablet; Take 1 tablet (1 mg total) by mouth every morning.  Dispense: 30 tablet; Refill: 2  -     CBC Auto Differential; Future; Expected date: 11/26/2024  -     Comprehensive Metabolic Panel; Future; Expected date: 11/26/2024  -     C-Reactive Protein; Future; Expected date: 11/26/2024  -     Hemoglobin A1C; Future; Expected date: 11/26/2024  -     Lipid Panel; Future; Expected date: 11/26/2024  -     TSH; Future; Expected date: 11/26/2024  -     Sedimentation rate; Future; Expected date: 11/26/2024  -     Microalbumin/Creatinine Ratio, Urine; Future; Expected date: 11/26/2024    6. Systemic lupus erythematosus, unspecified SLE type, unspecified organ involvement status  Check sed rate and CRP today  -     traMADoL (ULTRAM) 50 mg tablet; Take 2 tablets (100 mg total) by mouth every 6 (six) hours as needed for Pain.  Dispense: 60 tablet; Refill: 2  -     CBC Auto Differential; Future; Expected date: 11/26/2024  -     Comprehensive Metabolic Panel; Future; Expected date: 11/26/2024  -     C-Reactive Protein; Future; Expected date: 11/26/2024  -     Hemoglobin A1C; Future; Expected date: 11/26/2024  -     Lipid Panel; Future; Expected date: 11/26/2024  -     TSH; Future; Expected date: 11/26/2024  -     Sedimentation rate; Future; Expected date: 11/26/2024  -     Microalbumin/Creatinine Ratio, Urine; Future; Expected date: 11/26/2024    7. Chronic obstructive pulmonary disease, unspecified COPD type  Stable  -     CBC Auto Differential; Future; Expected date: 11/26/2024  -     Comprehensive  Metabolic Panel; Future; Expected date: 11/26/2024  -     C-Reactive Protein; Future; Expected date: 11/26/2024  -     Hemoglobin A1C; Future; Expected date: 11/26/2024  -     Lipid Panel; Future; Expected date: 11/26/2024  -     TSH; Future; Expected date: 11/26/2024  -     Sedimentation rate; Future; Expected date: 11/26/2024  -     Microalbumin/Creatinine Ratio, Urine; Future; Expected date: 11/26/2024    8. Mixed hyperlipidemia    -     CBC Auto Differential; Future; Expected date: 11/26/2024  -     Comprehensive Metabolic Panel; Future; Expected date: 11/26/2024  -     C-Reactive Protein; Future; Expected date: 11/26/2024  -     Hemoglobin A1C; Future; Expected date: 11/26/2024  -     Lipid Panel; Future; Expected date: 11/26/2024  -     TSH; Future; Expected date: 11/26/2024  -     Sedimentation rate; Future; Expected date: 11/26/2024  -     Microalbumin/Creatinine Ratio, Urine; Future; Expected date: 11/26/2024    9. Primary hypertension    -     CBC Auto Differential; Future; Expected date: 11/26/2024  -     Comprehensive Metabolic Panel; Future; Expected date: 11/26/2024  -     C-Reactive Protein; Future; Expected date: 11/26/2024  -     Hemoglobin A1C; Future; Expected date: 11/26/2024  -     Lipid Panel; Future; Expected date: 11/26/2024  -     TSH; Future; Expected date: 11/26/2024  -     Sedimentation rate; Future; Expected date: 11/26/2024  -     Microalbumin/Creatinine Ratio, Urine; Future; Expected date: 11/26/2024    10. Chronic kidney disease, stage 3a    -     CBC Auto Differential; Future; Expected date: 11/26/2024  -     Comprehensive Metabolic Panel; Future; Expected date: 11/26/2024  -     C-Reactive Protein; Future; Expected date: 11/26/2024  -     Hemoglobin A1C; Future; Expected date: 11/26/2024  -     Lipid Panel; Future; Expected date: 11/26/2024  -     TSH; Future; Expected date: 11/26/2024  -     Sedimentation rate; Future; Expected date: 11/26/2024  -     Microalbumin/Creatinine Ratio,  Urine; Future; Expected date: 11/26/2024    11. Type 2 diabetes mellitus with stage 2 chronic kidney disease, without long-term current use of insulin    -     CBC Auto Differential; Future; Expected date: 11/26/2024  -     Comprehensive Metabolic Panel; Future; Expected date: 11/26/2024  -     C-Reactive Protein; Future; Expected date: 11/26/2024  -     Hemoglobin A1C; Future; Expected date: 11/26/2024  -     Lipid Panel; Future; Expected date: 11/26/2024  -     TSH; Future; Expected date: 11/26/2024  -     Sedimentation rate; Future; Expected date: 11/26/2024  -     Microalbumin/Creatinine Ratio, Urine; Future; Expected date: 11/26/2024    Other orders  -     albuterol (PROVENTIL HFA) 90 mcg/actuation inhaler; Inhale 2 puffs into the lungs every 6 (six) hours as needed for Wheezing. Rescue  Dispense: 18 g; Refill: 3  -     azithromycin (Z-BRIAN) 250 MG tablet; Take 1 tablet (250 mg total) by mouth once daily.  Dispense: 90 tablet; Refill: 1  -     methylPREDNISolone (MEDROL DOSEPACK) 4 mg tablet; use as directed  Dispense: 21 each; Refill: 0         Follow Up:  Follow up in about 6 months (around 5/26/2025).

## 2024-11-27 LAB
CREAT UR-MCNC: 85.3 MG/DL (ref 45–106)
MICROALBUMIN UR-MCNC: 5.6 UG/ML
MICROALBUMIN/CREAT RATIO PNL UR: 6.6 MG/GM CR (ref 0–30)

## 2024-12-01 ENCOUNTER — PATIENT MESSAGE (OUTPATIENT)
Dept: FAMILY MEDICINE | Facility: CLINIC | Age: 69
End: 2024-12-01
Payer: MEDICARE

## 2024-12-02 DIAGNOSIS — M32.9 SYSTEMIC LUPUS ERYTHEMATOSUS, UNSPECIFIED SLE TYPE, UNSPECIFIED ORGAN INVOLVEMENT STATUS: ICD-10-CM

## 2024-12-02 RX ORDER — TRAMADOL HYDROCHLORIDE 50 MG/1
100 TABLET ORAL EVERY 6 HOURS PRN
Qty: 60 TABLET | Refills: 2 | Status: SHIPPED | OUTPATIENT
Start: 2024-12-02

## 2025-01-07 ENCOUNTER — PATIENT MESSAGE (OUTPATIENT)
Dept: FAMILY MEDICINE | Facility: CLINIC | Age: 70
End: 2025-01-07
Payer: MEDICARE

## 2025-02-27 ENCOUNTER — TELEPHONE (OUTPATIENT)
Dept: FAMILY MEDICINE | Facility: CLINIC | Age: 70
End: 2025-02-27
Payer: MEDICARE

## 2025-02-27 RX ORDER — METHYLPREDNISOLONE 4 MG/1
TABLET ORAL
Qty: 21 EACH | Refills: 0 | Status: SHIPPED | OUTPATIENT
Start: 2025-02-27 | End: 2025-03-20

## 2025-04-07 DIAGNOSIS — I10 HYPERTENSION, UNSPECIFIED TYPE: ICD-10-CM

## 2025-04-07 DIAGNOSIS — F41.9 ANXIETY: ICD-10-CM

## 2025-04-07 DIAGNOSIS — E03.9 ACQUIRED HYPOTHYROIDISM: ICD-10-CM

## 2025-04-07 DIAGNOSIS — E78.2 MIXED HYPERLIPIDEMIA: ICD-10-CM

## 2025-04-07 RX ORDER — ENALAPRIL MALEATE 5 MG/1
5 TABLET ORAL DAILY
Qty: 90 TABLET | Refills: 1 | Status: SHIPPED | OUTPATIENT
Start: 2025-04-07 | End: 2025-10-04

## 2025-04-07 RX ORDER — ATORVASTATIN CALCIUM 20 MG/1
20 TABLET, FILM COATED ORAL NIGHTLY
Qty: 90 TABLET | Refills: 1 | Status: SHIPPED | OUTPATIENT
Start: 2025-04-07 | End: 2025-10-04

## 2025-04-07 RX ORDER — ALPRAZOLAM 1 MG/1
1 TABLET ORAL EVERY MORNING
Qty: 30 TABLET | Refills: 2 | Status: SHIPPED | OUTPATIENT
Start: 2025-04-07

## 2025-04-07 RX ORDER — LEVOTHYROXINE SODIUM 125 UG/1
125 TABLET ORAL
Qty: 90 TABLET | Refills: 1 | Status: SHIPPED | OUTPATIENT
Start: 2025-04-07 | End: 2025-10-04

## 2025-05-05 ENCOUNTER — PATIENT OUTREACH (OUTPATIENT)
Facility: CLINIC | Age: 70
End: 2025-05-05
Payer: MEDICARE

## 2025-05-05 NOTE — PROGRESS NOTES
Health Maintenance Topic(s) Outreach Outcomes & Actions Taken:    Eye Exam - Outreach Outcomes & Actions Taken  : Last exam done in office, will be due after 5/8/25    Diabetic Foot Exam - Outreach Outcomes & Actions Taken  : Overdue, next PCP visit is a video visit - will address next time patient is in office    Lab(s) - Outreach Outcomes & Actions Taken  : Patient will need labs after 5/28/25    Primary Care Appt - Outreach Outcomes & Actions Taken  : Primary Care Appt Scheduled         Additional Notes:  No SDOH concerns at this time.

## 2025-06-11 ENCOUNTER — OFFICE VISIT (OUTPATIENT)
Dept: FAMILY MEDICINE | Facility: CLINIC | Age: 70
End: 2025-06-11
Payer: MEDICARE

## 2025-06-11 DIAGNOSIS — N18.31 CHRONIC KIDNEY DISEASE, STAGE 3A: ICD-10-CM

## 2025-06-11 DIAGNOSIS — E03.9 ACQUIRED HYPOTHYROIDISM: ICD-10-CM

## 2025-06-11 DIAGNOSIS — N18.2 TYPE 2 DIABETES MELLITUS WITH STAGE 2 CHRONIC KIDNEY DISEASE, WITHOUT LONG-TERM CURRENT USE OF INSULIN: ICD-10-CM

## 2025-06-11 DIAGNOSIS — E78.2 MIXED HYPERLIPIDEMIA: Primary | ICD-10-CM

## 2025-06-11 DIAGNOSIS — I10 PRIMARY HYPERTENSION: ICD-10-CM

## 2025-06-11 DIAGNOSIS — J44.9 CHRONIC OBSTRUCTIVE PULMONARY DISEASE, UNSPECIFIED COPD TYPE: ICD-10-CM

## 2025-06-11 DIAGNOSIS — M32.9 SYSTEMIC LUPUS ERYTHEMATOSUS, UNSPECIFIED SLE TYPE, UNSPECIFIED ORGAN INVOLVEMENT STATUS: ICD-10-CM

## 2025-06-11 DIAGNOSIS — E11.22 TYPE 2 DIABETES MELLITUS WITH STAGE 2 CHRONIC KIDNEY DISEASE, WITHOUT LONG-TERM CURRENT USE OF INSULIN: ICD-10-CM

## 2025-06-11 DIAGNOSIS — E66.01 SEVERE OBESITY (BMI 35.0-39.9) WITH COMORBIDITY: ICD-10-CM

## 2025-06-11 NOTE — PROGRESS NOTES
SUBJECTIVE:  Keisha Pelayo is a 69 y.o. female here for No chief complaint on file.      HPI  Patient being seen as a TeleMed visit per request of the patient for follow-up on chronic conditions.  See assessment and plan for individual list of issues.  Since last visit she did have a fall at her son's house.  She has a head contusion and ended up going to the ER 3 days later with a CT of her head that was normal.  She has not had any residual effects.    Keisha's allergies, medications, history, and problem list were updated as appropriate.    Review of Systems   See HPI.    No results found for this or any previous visit (from the past 3 weeks).    OBJECTIVE:  Vital signs  There were no vitals filed for this visit.     Physical Exam this is a TeleMed visit.  Patient appeared to be in no distress    ASSESSMENT/PLAN:  1. Mixed hyperlipidemia  Continue atorvastatin    2. Primary hypertension  Blood pressure has been doing very well, continue enalapril and atenolol    3. Chronic kidney disease, stage 3a  On ACE inhibitor, consider SGLT2 inhibitor    4. Systemic lupus erythematosus, unspecified SLE type, unspecified organ involvement status  She is on Celebrex and stable with a few flares a year for which she takes a Medrol Waldemar    5. Acquired hypothyroidism  On levothyroxine    6. Type 2 diabetes mellitus with stage 2 chronic kidney disease, without long-term current use of insulin  Stable on metformin    7. Chronic obstructive pulmonary disease, unspecified COPD type  Stable, not requiring daily inhaler at this time    8. Severe obesity (BMI 35.0-39.9) with comorbidity  She has increased her walking and lost 4 lb in the last visit       This is a TeleMed visit took place between 1:00 p.m. and 1:15 p.m. at the patient's home near [facility] using both audio and visual equipment  Follow Up:  Follow up in about 6 months (around 12/11/2025) for recheck, wellness.

## 2025-06-25 DIAGNOSIS — M32.9 SYSTEMIC LUPUS ERYTHEMATOSUS, UNSPECIFIED SLE TYPE, UNSPECIFIED ORGAN INVOLVEMENT STATUS: ICD-10-CM

## 2025-06-25 RX ORDER — TRAMADOL HYDROCHLORIDE 50 MG/1
100 TABLET, FILM COATED ORAL EVERY 6 HOURS PRN
Qty: 60 TABLET | Refills: 2 | Status: SHIPPED | OUTPATIENT
Start: 2025-06-25

## 2025-07-30 ENCOUNTER — OFFICE VISIT (OUTPATIENT)
Dept: FAMILY MEDICINE | Facility: CLINIC | Age: 70
End: 2025-07-30
Payer: MEDICARE

## 2025-07-30 VITALS
HEIGHT: 64 IN | OXYGEN SATURATION: 96 % | WEIGHT: 213.38 LBS | DIASTOLIC BLOOD PRESSURE: 68 MMHG | HEART RATE: 68 BPM | TEMPERATURE: 97 F | SYSTOLIC BLOOD PRESSURE: 122 MMHG | BODY MASS INDEX: 36.43 KG/M2

## 2025-07-30 DIAGNOSIS — L91.8 CUTANEOUS SKIN TAGS: ICD-10-CM

## 2025-07-30 DIAGNOSIS — F41.9 ANXIETY: ICD-10-CM

## 2025-07-30 DIAGNOSIS — R30.0 DYSURIA: Primary | ICD-10-CM

## 2025-07-30 LAB
BILIRUB SERPL-MCNC: NEGATIVE MG/DL
BLOOD URINE, POC: NORMAL
CLARITY, POC UA: CLEAR
COLOR, POC UA: NORMAL
GLUCOSE UR QL STRIP: NEGATIVE
KETONES UR QL STRIP: NEGATIVE
LEUKOCYTE ESTERASE URINE, POC: NEGATIVE
NITRITE, POC UA: NEGATIVE
PH, POC UA: 6.5
PROTEIN, POC: NEGATIVE
SPECIFIC GRAVITY, POC UA: <=1.005
UROBILINOGEN, POC UA: 0.2

## 2025-07-30 PROCEDURE — 81002 URINALYSIS NONAUTO W/O SCOPE: CPT | Mod: RHCUB | Performed by: FAMILY MEDICINE

## 2025-07-30 RX ORDER — SULFAMETHOXAZOLE AND TRIMETHOPRIM 800; 160 MG/1; MG/1
1 TABLET ORAL 2 TIMES DAILY
Qty: 20 TABLET | Refills: 0 | Status: SHIPPED | OUTPATIENT
Start: 2025-07-30 | End: 2025-08-09

## 2025-07-30 RX ORDER — FLUCONAZOLE 100 MG/1
100 TABLET ORAL WEEKLY
Qty: 10 TABLET | Refills: 0 | Status: SHIPPED | OUTPATIENT
Start: 2025-07-30 | End: 2025-09-28

## 2025-07-30 RX ORDER — ALPRAZOLAM 1 MG/1
1 TABLET ORAL EVERY MORNING
Qty: 30 TABLET | Refills: 2 | Status: SHIPPED | OUTPATIENT
Start: 2025-07-30

## 2025-07-30 RX ORDER — ALPRAZOLAM 1 MG/1
1 TABLET ORAL EVERY MORNING
Qty: 30 TABLET | Refills: 2 | Status: CANCELLED | OUTPATIENT
Start: 2025-07-30

## 2025-07-30 RX ORDER — SULFAMETHOXAZOLE AND TRIMETHOPRIM 800; 160 MG/1; MG/1
1 TABLET ORAL DAILY
Qty: 60 TABLET | Refills: 0 | Status: SHIPPED | OUTPATIENT
Start: 2025-08-08 | End: 2025-10-07

## 2025-07-30 NOTE — PROGRESS NOTES
"SUBJECTIVE:  Keisha Pelayo is a 70 y.o. female here for Urinary Tract Infection and Follow-up      HPI  Patient here for UTI symptoms.  They started after she had gotten dehydrated.  She had an has a urinary tract infection in 20 years but recently has been out fishing dehydrated had UTI symptoms.  She went to an urgent care on 2 occasions and took a round of Cipro and Macrobid but is still having symptoms.  She said the last time she has a UTI she used to get them frequently for many years and finally went to a urologist who did some testing and found she had a cyst on her kidney.  It was suspected that this cyst was infected and she ended up taking Bactrim for 2 months that finally resolved her issues.  She would like to do this again.  She also has a few skin tags that are bothering around her neck line  [place] allergies, medications, history, and problem list were updated as appropriate.    Review of Systems   See HPI.    Recent Results (from the past 3 weeks)   POCT URINE DIPSTICK WITHOUT MICROSCOPE    Collection Time: 07/30/25  1:47 PM   Result Value Ref Range    Glucose, UA Negative     Bilirubin, POC Negative     Ketones, UA Negative     Spec Grav UA <=1.005     Blood, UA Trace-intact     pH, UA 6.5     Protein, POC Negative     Urobilinogen, UA 0.2     Nitrite, UA Negative     WBC, UA Negative     Color, UA Light Yellow     Clarity, UA Clear        OBJECTIVE:  Vital signs  Vitals:    07/30/25 1343   BP: 122/68   Patient Position: Sitting   Pulse: 68   Temp: 96.9 °F (36.1 °C)   SpO2: 96%   Weight: 96.8 kg (213 lb 6.4 oz)   Height: 5' 3.86" (1.622 m)        Physical Exam she has a few skin tags in her neck line  Non ill-appearing  ASSESSMENT/PLAN:  1. Dysuria  We will send a urine culture.  We will go ahead and treat for 2 months with Bactrim DS daily following a 10 day course of twice a day  -     POCT URINE DIPSTICK WITHOUT MICROSCOPE    2. Anxiety  Refilled alprazolam  -     ALPRAZolam (XANAX) 1 MG tablet; " Take 1 tablet (1 mg total) by mouth every morning.  Dispense: 30 tablet; Refill: 2    3. Cutaneous skin tags  Cryotherapy performed on 2 skin tag lesions on the neck.  Each was frozen for 15 seconds    Other orders  -     sulfamethoxazole-trimethoprim 800-160mg (BACTRIM DS) 800-160 mg Tab; Take 1 tablet by mouth 2 (two) times daily. for 10 days  Dispense: 20 tablet; Refill: 0  -     sulfamethoxazole-trimethoprim 800-160mg (BACTRIM DS) 800-160 mg Tab; Take 1 tablet by mouth once daily.  Dispense: 60 tablet; Refill: 0  -     fluconazole (DIFLUCAN) 100 MG tablet; Take 1 tablet (100 mg total) by mouth once a week.  Dispense: 10 tablet; Refill: 0         Follow Up:  No follow-ups on file.             no lymph node enlargement